# Patient Record
Sex: FEMALE | Race: BLACK OR AFRICAN AMERICAN | Employment: FULL TIME | URBAN - METROPOLITAN AREA
[De-identification: names, ages, dates, MRNs, and addresses within clinical notes are randomized per-mention and may not be internally consistent; named-entity substitution may affect disease eponyms.]

---

## 2017-03-30 ENCOUNTER — HOSPITAL ENCOUNTER (OUTPATIENT)
Dept: CT IMAGING | Age: 62
Discharge: HOME | End: 2017-03-30
Attending: FAMILY MEDICINE
Payer: SELF-PAY

## 2017-03-30 DIAGNOSIS — Z13.9 SCREENING: ICD-10-CM

## 2017-03-30 PROCEDURE — 75571 CT HRT W/O DYE W/CA TEST: CPT

## 2017-03-31 NOTE — CARDIO/PULMONARY
Cardiopulmonary Rehab: I reached this patient by phone and shared her coronary calcium CT score of \"6 \" with her. Education given regarding coronary artery disease and its effects on the cardiovascular system were reviewed. Patient states she is not a smoker. Patient states that she does not have a family history of coronary artery disease, reports she is currently taking cholesterol medication and blood pressure medication. Patient states she is diabetic, is overweight (BMI 32.9), reports not regularly making heart healthy diet choices and is not regularly physically active. We discussed the recommendations for and potential benefits of weight loss, regular physical activity and a heart healthy diet. Patient does not feel that stress is a risk factor for her. Patient to follow up with primary care physician. Understanding verbalized and no further questions at this time.

## 2017-05-15 ENCOUNTER — HOSPITAL ENCOUNTER (OUTPATIENT)
Dept: NUTRITION | Age: 62
Discharge: HOME | End: 2017-05-15
Payer: COMMERCIAL

## 2017-05-15 PROCEDURE — 97802 MEDICAL NUTRITION INDIV IN: CPT | Performed by: DIETITIAN, REGISTERED

## 2017-05-15 NOTE — PROGRESS NOTES
[phone] [place] [address], [facility] [place] [facility], [address] [facility] [place]  Jackie Banks   [phone]   Nutrition Assessment - Medical Nutrition Therapy   Outpatient  Initial Evaluation         Patient Name: Mal Vargas : 1955   Treatment Diagnosis: Diabetes Onset Date:    Referral Source: Loki Agarwal MD Start of Care [facility]): 5/15/2017     Ht: 67 in  cm Wt: 212.4 lb  kg   BMI: 33.3  BMR male    BMR female 1570     Diagnosis: Diabetes, Obesity, HTN     Medications of Nutritional Significance:   Janument xp, hydrochlorothiazide, losartan, farxiga, rosa asprin, lantus solostar, vitamin D. Subjective/Assessment: Pt is a 65yo female who works for [employer]. She has had diabetes for many years and notes that her last A1C was above 8%. She states she was previously better controlled when living alone and only cooking for herself. She believes her weight and A1C began to creep up after moving in with her significant other and adjusting to cooking for him (picky eater) and his son. She would like to decrease the amount of medications she is taking for her diabetes and has a personal weight goal of 185#. She does not currently have an exercise plan but is interested in starting the Fitness program offered at this [facility] location to jump-start her routine. Pt is knowledgeable of carbohydrate and protein sources, able to read labels, notices menu calorie labeling, and is able to carb count. She has previously had success with carb counting for weight loss before through another program, but did not continue the diet changes once she stopped attending the sessions. Reviewed pathophysiology of diabetes, importance of carb control, meal timing, and exercise for blood sugar and weight management. Asked pt to check for night time blood sugar as well as morning (150-215 average fasted).  She is currently using sliding scale insulin at night based on her morning blood sugars. Worked with pt to set goals and create meal plan. Pt expressed comprehension, high motivation, and compliance is expected. Current Eating Patterns: 3 meals + 2 snacks per day. Afternoon snack is sometimes chips or sweets. Buying lunches out instead of packing she notes her portions increase. Dinner meals she cooks for picky eating significant other and notes mostly meat and potatoes. Eating out more often lately and stress eating due to stress at work. This work stress is now over and she feels the stress eating will decrease as well. Portions sometimes larger when not measuring. Handouts/  Information Provided: [x]  Carbohydrates  [x]  Protein  []  Fiber  []  Serving Sizes  []  Meal and Snack Ideas  []  Food Journals []  Diabetes  []  Cholesterol  []  Sodium  [x]  Gen Nutr Guidelines  []  SBGM Guidelines  []  Others:    Estimate Needs:   Calories:  7974 Protein: 98 Carbs: 177 Fat: 52   Kcal/day  g/day  g/day  g/day         percent: 25 percent: 45 percent: 30     Nutritional Goal - To promote lifestyle changes to result in:    [x]  weight loss  [x]  Improved diabetic control  []  Decreased cholesterol levels  []  Decreased blood pressure  []  weight maintenance []  Preventing any interactions associated with food allergies  []  Adequate weight gain toward goal weight  []  Other:          Recommendations: -add vegetables to dinner meal for you (even if no one else is eating them)  -Eat 3 meals + 1-2 optional snacks each day. (within carbohydrate limits)  -20-30g cho per meal + 20-30g protein  -15-20g cho per snack + protein  -[place] at work.  Call to set up fitness program at [facility] with: pt   Potential Barriers to Learning: none     Dietitian Signature: Joaquin Bunch MS,RD Date: 5/15/2017   Follow-up: June 12 @ 5:30pm Time: 6:28 PM

## 2018-01-15 ENCOUNTER — HOSPITAL ENCOUNTER (OUTPATIENT)
Dept: MAMMOGRAPHY | Age: 63
Discharge: HOME | End: 2018-01-15
Attending: FAMILY MEDICINE
Payer: COMMERCIAL

## 2018-01-15 DIAGNOSIS — Z12.31 VISIT FOR SCREENING MAMMOGRAM: ICD-10-CM

## 2018-01-15 PROCEDURE — 77063 BREAST TOMOSYNTHESIS BI: CPT

## 2019-01-22 ENCOUNTER — HOSPITAL ENCOUNTER (OUTPATIENT)
Dept: MAMMOGRAPHY | Age: 64
Discharge: HOME | End: 2019-01-22
Attending: FAMILY MEDICINE
Payer: COMMERCIAL

## 2019-01-22 DIAGNOSIS — Z12.39 SCREENING BREAST EXAMINATION: ICD-10-CM

## 2019-01-22 PROCEDURE — 77063 BREAST TOMOSYNTHESIS BI: CPT

## 2019-10-10 ENCOUNTER — OFFICE VISIT (OUTPATIENT)
Dept: HEMATOLOGY | Age: 64
End: 2019-10-10

## 2019-10-10 VITALS
OXYGEN SATURATION: 100 % | HEART RATE: 63 BPM | TEMPERATURE: 98.4 F | DIASTOLIC BLOOD PRESSURE: 68 MMHG | WEIGHT: 208 LBS | SYSTOLIC BLOOD PRESSURE: 131 MMHG

## 2019-10-10 DIAGNOSIS — K76.0 FATTY LIVER: Primary | ICD-10-CM

## 2019-10-10 PROBLEM — E11.8 CONTROLLED TYPE 2 DIABETES MELLITUS WITH COMPLICATION, WITHOUT LONG-TERM CURRENT USE OF INSULIN (HCC): Status: ACTIVE | Noted: 2019-10-10

## 2019-10-10 PROBLEM — I10 ESSENTIAL HYPERTENSION: Status: ACTIVE | Noted: 2019-10-10

## 2019-10-10 PROBLEM — E78.00 HIGH CHOLESTEROL: Status: ACTIVE | Noted: 2019-10-10

## 2019-10-10 RX ORDER — ROSUVASTATIN CALCIUM 40 MG/1
40 TABLET, COATED ORAL
COMMUNITY

## 2019-10-10 RX ORDER — ASPIRIN 81 MG/1
TABLET ORAL DAILY
COMMUNITY
End: 2021-10-25

## 2019-10-10 RX ORDER — INSULIN DEGLUDEC 100 U/ML
INJECTION, SOLUTION SUBCUTANEOUS
COMMUNITY
End: 2021-10-25

## 2019-10-10 RX ORDER — BUMETANIDE 0.5 MG/1
0.5 TABLET ORAL
COMMUNITY

## 2019-10-10 RX ORDER — VALSARTAN 160 MG/1
TABLET ORAL DAILY
COMMUNITY
End: 2021-10-25

## 2019-10-10 RX ORDER — METFORMIN HYDROCHLORIDE 500 MG/1
500 TABLET ORAL 2 TIMES DAILY WITH MEALS
COMMUNITY

## 2019-10-10 NOTE — PROGRESS NOTES
Chief Complaint   Patient presents with    Other     fibroscan     3 most recent PHQ Screens 10/10/2019   Little interest or pleasure in doing things Not at all   Feeling down, depressed, irritable, or hopeless Not at all   Total Score PHQ 2 0     Abuse Screening Questionnaire 10/10/2019   Do you ever feel afraid of your partner? N   Are you in a relationship with someone who physically or mentally threatens you? N   Is it safe for you to go home?  Y     Learning Assessment 10/10/2019   PRIMARY LEARNER Patient   BARRIERS PRIMARY LEARNER NONE   CO-LEARNER CAREGIVER No   PRIMARY LANGUAGE ENGLISH   LEARNER PREFERENCE PRIMARY DEMONSTRATION   ANSWERED BY patient   RELATIONSHIP SELF     Visit Vitals  /68 (BP 1 Location: Right arm, BP Patient Position: Sitting)   Pulse 63   Temp 98.4 °F (36.9 °C) (Tympanic)   Wt 208 lb (94.3 kg)   SpO2 100%

## 2019-10-10 NOTE — PROGRESS NOTES
[address] [facility], MD, MD Manuel Bonds PA-C Morrell Gallop, [facility]-BC     Estela NICHOLS Nancy, [facility]   JOSEFINA Harp, [facility]       Mayito Deputado Jaret De Yanez [address]    at [address], [address] [place] [address], [place] [address].    [phone]    FAX: [address] [place] [address]    at [address], [address], [address] [place] [address],[address]    [address] [place] [address] Street: [phone]     Patient Care Team:  Jennifer Lee MD as PCP - General (Family Practice)  Lotus Mortimer, MD (Gastroenterology)     Problem List  Never Reviewed          Codes Class Noted    Controlled type 2 diabetes mellitus with complication, without long-term current use of insulin ([facility] ICD-10-CM: E11.8  ICD-9-CM: 250.90  10/10/2019        Fatty liver ICD-10-CM: K76.0  ICD-9-CM: 571.8  10/10/2019        Essential hypertension ICD-10-CM: I10  ICD-9-CM: 401.9  10/10/2019        High cholesterol ICD-10-CM: E78.00  ICD-9-CM: 272.0  10/10/2019            The physicians listed above have asked me to see Maria Teresa Mansfield in consultation regarding fatty liver and to perform assessment of fibrosis by means of in-office FibroScan analysis. The patient is a 60 y.o.  female who was found to have liver disease in 2019. The patient has the following symptoms which are thought to be due to the liver disease: pain in the right side over the liver. Although she states an ultrasound showed she had gallstones. The patient has not experienced the following symptoms: yellowing of the eyes or skin or swelling of the lower extremities. The patient completes all daily activities without any functional limitations. 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       Weight loss is the only treatment for NAFLD and this was discussed in detail with the patient in the office today. We discussed the fatty liver diet and a handout was provided. Assessment of fibrosis was made through performance of FibroScan in the office today. The results of the analysis were shared with the patient in the office at the time of the procedure. A copy of the report was provided to the patient and will be forwarded to the referring medical practice. All questions were answered. The patient expressed a clear understanding of the above. ALLERGIES:  Not on File    MEDICATIONS:  Current Outpatient Medications   Medication Sig    metFORMIN (GLUCOPHAGE) 500 mg tablet Take  by mouth two (2) times daily (with meals).  semaglutide (OZEMPIC) 0.25 mg/0.2 mL (2 mg/1.5 mL) sub-q pen by SubCUTAneous route every seven (7) days.  insulin degludec (TRESIBA FLEXTOUCH U-100) 100 unit/mL (3 mL) inpn by SubCUTAneous route.  bumetanide (BUMEX) 0.5 mg tablet Take  by mouth daily.  aspirin delayed-release 81 mg tablet Take  by mouth daily.  rosuvastatin (CRESTOR) 40 mg tablet Take 40 mg by mouth nightly.  valsartan (DIOVAN) 160 mg tablet Take  by mouth daily.  ergocalciferol, vitamin D2, (VITAMIN D2 PO) Take  by mouth.  vitamin E acetate (VITAMIN E PO) Take  by mouth. No current facility-administered medications for this visit. SYSTEM REVIEW NOT RELATED TO LIVER DISEASE OR REVIEWED ABOVE:  Constitution systems: Negative for fever, chills, weight gain, weight loss. Eyes: Negative for visual changes. ENT: Negative for sore throat, painful swallowing. Respiratory: Negative for cough, hemoptysis, SOB. Cardiology: Negative for chest pain, palpitations. GI:  Negative for constipation or diarrhea. : Negative for urinary frequency, dysuria, hematuria, nocturia. Skin: Negative for rash. Hematology: Negative for easy bruising, blood clots.     Musculo-skeletal: Negative for back pain, muscle pain, weakness. Neurologic: Negative for headaches, dizziness, vertigo, memory problems not related to HE. Psychology: Negative for anxiety, depression. PHYSICAL EXAMINATION:  Visit Vitals  /68 (BP 1 Location: Right arm, BP Patient Position: Sitting)   Pulse 63   Temp 98.4 °F (36.9 °C) (Tympanic)   Wt 208 lb (94.3 kg)   SpO2 100%     General: No acute distress. Skin: No spider angiomata. No jaundice. No palmar erythema. Abdomen: Soft non-tender. Liver size normal to percussion/palpation. Spleen not palpable. No obvious ascites. Extremities: No edema. No muscle wasting. No gross arthritic changes. Neurologic: Alert and oriented. Cranial nerves grossly intact. No asterixis. LABORATORY STUDIES:  Recent liver function panel, CBC with platelet count and BMP are not available. SEROLOGIES:  Not available or performed. Testing will be performed as indicated. LIVER HISTOLOGY:  10/2019. FibroScan performed at [facility] [employer] of [address] [place] [address]. EkPa was 3.9. IQR/med 10%. . The results suggested a fibrosis level of F0. The CAP score suggests fatty liver. ENDOSCOPIC PROCEDURES:  Not available or performed    RADIOLOGY:  Not available or performed    OTHER TESTING:  Not available or performed    FOLLOW-UP:  All of the issues listed above in the assessment and plan were discussed with the patient. All questions were answered. The patient expressed a clear understanding of the above. The patient will follow-up with the referring physician.     Elysia Meek, ALDEN-BC  Liver [facility] [address] [place] [address], [address] [address]  [address]. [phone]

## 2020-01-27 ENCOUNTER — HOSPITAL ENCOUNTER (OUTPATIENT)
Dept: MAMMOGRAPHY | Age: 65
Discharge: HOME | End: 2020-01-27
Attending: OBSTETRICS & GYNECOLOGY
Payer: COMMERCIAL

## 2020-01-27 DIAGNOSIS — Z12.31 VISIT FOR SCREENING MAMMOGRAM: ICD-10-CM

## 2020-01-27 PROCEDURE — 77063 BREAST TOMOSYNTHESIS BI: CPT

## 2020-07-24 ENCOUNTER — HOSPITAL ENCOUNTER (OUTPATIENT)
Dept: CT IMAGING | Age: 65
Discharge: HOME | End: 2020-07-24
Attending: INTERNAL MEDICINE
Payer: COMMERCIAL

## 2020-07-24 DIAGNOSIS — R10.11 RIGHT UPPER QUADRANT PAIN: ICD-10-CM

## 2020-07-24 DIAGNOSIS — K21.9 GASTROESOPHAGEAL REFLUX DISEASE WITHOUT ESOPHAGITIS: ICD-10-CM

## 2020-07-24 DIAGNOSIS — K44.9 DIAPHRAGMATIC HERNIA WITHOUT OBSTRUCTION OR GANGRENE: ICD-10-CM

## 2020-07-24 DIAGNOSIS — K76.0 NONALCOHOLIC FATTY LIVER DISEASE WITHOUT NONALCOHOLIC STEATOHEPATITIS (NASH): ICD-10-CM

## 2020-07-24 PROCEDURE — 74177 CT ABD & PELVIS W/CONTRAST: CPT

## 2020-07-24 PROCEDURE — 74011000255 HC RX REV CODE- 255: Performed by: INTERNAL MEDICINE

## 2020-07-24 PROCEDURE — 74011636320 HC RX REV CODE- 636/320: Performed by: INTERNAL MEDICINE

## 2020-07-24 PROCEDURE — 74011000258 HC RX REV CODE- 258: Performed by: INTERNAL MEDICINE

## 2020-07-24 RX ORDER — BARIUM SULFATE 20 MG/ML
900 SUSPENSION ORAL
Status: COMPLETED | OUTPATIENT
Start: 2020-07-24 | End: 2020-07-24

## 2020-07-24 RX ORDER — SODIUM CHLORIDE 9 MG/ML
50 INJECTION, SOLUTION INTRAVENOUS ONCE
Status: COMPLETED | OUTPATIENT
Start: 2020-07-24 | End: 2020-07-24

## 2020-07-24 RX ORDER — SODIUM CHLORIDE 0.9 % (FLUSH) 0.9 %
10 SYRINGE (ML) INJECTION ONCE
Status: COMPLETED | OUTPATIENT
Start: 2020-07-24 | End: 2020-07-24

## 2020-07-24 RX ADMIN — Medication 10 ML: at 09:30

## 2020-07-24 RX ADMIN — SODIUM CHLORIDE 50 ML: 900 INJECTION, SOLUTION INTRAVENOUS at 09:30

## 2020-07-24 RX ADMIN — BARIUM SULFATE 900 ML: 20 SUSPENSION ORAL at 09:30

## 2020-07-24 RX ADMIN — IOPAMIDOL 100 ML: 755 INJECTION, SOLUTION INTRAVENOUS at 09:30

## 2021-01-15 ENCOUNTER — TRANSCRIBE ORDER (OUTPATIENT)
Dept: SCHEDULING | Age: 66
End: 2021-01-15

## 2021-01-15 DIAGNOSIS — Z12.31 VISIT FOR SCREENING MAMMOGRAM: Primary | ICD-10-CM

## 2021-02-10 ENCOUNTER — HOSPITAL ENCOUNTER (OUTPATIENT)
Dept: MAMMOGRAPHY | Age: 66
Discharge: HOME | End: 2021-02-10
Attending: OBSTETRICS & GYNECOLOGY
Payer: COMMERCIAL

## 2021-02-10 DIAGNOSIS — Z12.31 VISIT FOR SCREENING MAMMOGRAM: ICD-10-CM

## 2021-02-10 PROCEDURE — 77063 BREAST TOMOSYNTHESIS BI: CPT

## 2021-02-18 ENCOUNTER — TRANSCRIBE ORDER (OUTPATIENT)
Dept: SCHEDULING | Age: 66
End: 2021-02-18

## 2021-02-18 DIAGNOSIS — E83.52 HYPERCALCEMIA: Primary | ICD-10-CM

## 2021-03-06 ENCOUNTER — IMMUNIZATION (OUTPATIENT)
Dept: INTERNAL MEDICINE CLINIC | Age: 66
End: 2021-03-06
Payer: COMMERCIAL

## 2021-03-06 DIAGNOSIS — Z23 ENCOUNTER FOR IMMUNIZATION: Primary | ICD-10-CM

## 2021-03-06 PROCEDURE — 0001A COVID-19, MRNA, LNP-S, PF, 30MCG/0.3ML DOSE(PFIZER): CPT | Performed by: FAMILY MEDICINE

## 2021-03-06 PROCEDURE — 91300 COVID-19, MRNA, LNP-S, PF, 30MCG/0.3ML DOSE(PFIZER): CPT | Performed by: FAMILY MEDICINE

## 2021-03-27 ENCOUNTER — IMMUNIZATION (OUTPATIENT)
Dept: INTERNAL MEDICINE CLINIC | Age: 66
End: 2021-03-27
Payer: COMMERCIAL

## 2021-03-27 DIAGNOSIS — Z23 ENCOUNTER FOR IMMUNIZATION: Primary | ICD-10-CM

## 2021-03-27 PROCEDURE — 91300 COVID-19, MRNA, LNP-S, PF, 30MCG/0.3ML DOSE(PFIZER): CPT | Performed by: FAMILY MEDICINE

## 2021-03-27 PROCEDURE — 0002A COVID-19, MRNA, LNP-S, PF, 30MCG/0.3ML DOSE(PFIZER): CPT | Performed by: FAMILY MEDICINE

## 2021-04-01 ENCOUNTER — HOSPITAL ENCOUNTER (OUTPATIENT)
Dept: NUCLEAR MEDICINE | Age: 66
Discharge: HOME | End: 2021-04-01
Attending: INTERNAL MEDICINE
Payer: COMMERCIAL

## 2021-04-01 ENCOUNTER — HOSPITAL ENCOUNTER (OUTPATIENT)
Dept: ULTRASOUND IMAGING | Age: 66
Discharge: HOME | End: 2021-04-01
Attending: INTERNAL MEDICINE
Payer: COMMERCIAL

## 2021-04-01 DIAGNOSIS — E83.52 HYPERCALCEMIA: ICD-10-CM

## 2021-04-01 PROCEDURE — 76536 US EXAM OF HEAD AND NECK: CPT

## 2021-04-01 PROCEDURE — 78070 PARATHYROID PLANAR IMAGING: CPT

## 2021-05-20 ENCOUNTER — TRANSCRIBE ORDER (OUTPATIENT)
Dept: SCHEDULING | Age: 66
End: 2021-05-20

## 2021-05-20 DIAGNOSIS — E04.2 MULTINODULAR NON-TOXIC GOITER: Primary | ICD-10-CM

## 2021-05-27 ENCOUNTER — HOSPITAL ENCOUNTER (OUTPATIENT)
Dept: ULTRASOUND IMAGING | Age: 66
Discharge: HOME | End: 2021-05-27
Attending: INTERNAL MEDICINE
Payer: COMMERCIAL

## 2021-05-27 DIAGNOSIS — E04.2 MULTINODULAR NON-TOXIC GOITER: ICD-10-CM

## 2021-05-27 PROCEDURE — 77030014115

## 2021-05-27 PROCEDURE — 88172 CYTP DX EVAL FNA 1ST EA SITE: CPT

## 2021-05-27 PROCEDURE — 10005 FNA BX W/US GDN 1ST LES: CPT

## 2021-05-27 PROCEDURE — 88173 CYTOPATH EVAL FNA REPORT: CPT

## 2021-05-27 PROCEDURE — 74011000250 HC RX REV CODE- 250: Performed by: NURSE PRACTITIONER

## 2021-05-27 RX ORDER — LIDOCAINE HYDROCHLORIDE 10 MG/ML
10 INJECTION, SOLUTION EPIDURAL; INFILTRATION; INTRACAUDAL; PERINEURAL
Status: COMPLETED | OUTPATIENT
Start: 2021-05-27 | End: 2021-05-27

## 2021-05-27 RX ADMIN — LIDOCAINE HYDROCHLORIDE 10 ML: 10 INJECTION, SOLUTION EPIDURAL; INFILTRATION; INTRACAUDAL; PERINEURAL at 14:00

## 2021-10-14 ENCOUNTER — TRANSCRIBE ORDER (OUTPATIENT)
Dept: REGISTRATION | Age: 66
End: 2021-10-14

## 2021-10-14 DIAGNOSIS — Z01.812 PRE-PROCEDURE LAB EXAM: Primary | ICD-10-CM

## 2021-10-25 ENCOUNTER — HOSPITAL ENCOUNTER (OUTPATIENT)
Dept: PREADMISSION TESTING | Age: 66
Discharge: HOME | End: 2021-10-25
Payer: COMMERCIAL

## 2021-10-25 VITALS
BODY MASS INDEX: 31.25 KG/M2 | WEIGHT: 199.08 LBS | HEIGHT: 67 IN | TEMPERATURE: 98.5 F | SYSTOLIC BLOOD PRESSURE: 135 MMHG | DIASTOLIC BLOOD PRESSURE: 76 MMHG | HEART RATE: 61 BPM

## 2021-10-25 DIAGNOSIS — Z01.812 PRE-PROCEDURE LAB EXAM: ICD-10-CM

## 2021-10-25 DIAGNOSIS — E11.65 TYPE 2 DIABETES MELLITUS WITH HYPERGLYCEMIA, UNSPECIFIED WHETHER LONG TERM INSULIN USE (HCC): ICD-10-CM

## 2021-10-25 DIAGNOSIS — E11.8 CONTROLLED TYPE 2 DIABETES MELLITUS WITH COMPLICATION, WITHOUT LONG-TERM CURRENT USE OF INSULIN (HCC): Primary | ICD-10-CM

## 2021-10-25 LAB
ALBUMIN SERPL-MCNC: 3.5 G/DL (ref 3.5–5)
ALBUMIN/GLOB SERPL: 1.1 {RATIO} (ref 1.1–2.2)
ALP SERPL-CCNC: 124 U/L (ref 45–117)
ALT SERPL-CCNC: 20 U/L (ref 12–78)
ANION GAP SERPL CALC-SCNC: 3 MMOL/L (ref 5–15)
AST SERPL-CCNC: 8 U/L (ref 15–37)
BASOPHILS # BLD: 0 K/UL (ref 0–0.1)
BASOPHILS NFR BLD: 1 % (ref 0–1)
BILIRUB SERPL-MCNC: 0.5 MG/DL (ref 0.2–1)
BUN SERPL-MCNC: 8 MG/DL (ref 6–20)
BUN/CREAT SERPL: 11 (ref 12–20)
CALCIUM SERPL-MCNC: 10 MG/DL (ref 8.5–10.1)
CHLORIDE SERPL-SCNC: 107 MMOL/L (ref 97–108)
CO2 SERPL-SCNC: 31 MMOL/L (ref 21–32)
CREAT SERPL-MCNC: 0.76 MG/DL (ref 0.55–1.02)
DIFFERENTIAL METHOD BLD: NORMAL
EOSINOPHIL # BLD: 0.1 K/UL (ref 0–0.4)
EOSINOPHIL NFR BLD: 2 % (ref 0–7)
ERYTHROCYTE [DISTWIDTH] IN BLOOD BY AUTOMATED COUNT: 13.2 % (ref 11.5–14.5)
EST. AVERAGE GLUCOSE BLD GHB EST-MCNC: 194 MG/DL
GLOBULIN SER CALC-MCNC: 3.1 G/DL (ref 2–4)
GLUCOSE SERPL-MCNC: 140 MG/DL (ref 65–100)
HBA1C MFR BLD: 8.4 % (ref 4–5.6)
HCT VFR BLD AUTO: 44.9 % (ref 35–47)
HGB BLD-MCNC: 14.1 G/DL (ref 11.5–16)
IMM GRANULOCYTES # BLD AUTO: 0 K/UL (ref 0–0.04)
IMM GRANULOCYTES NFR BLD AUTO: 0 % (ref 0–0.5)
LYMPHOCYTES # BLD: 1.4 K/UL (ref 0.8–3.5)
LYMPHOCYTES NFR BLD: 35 % (ref 12–49)
MCH RBC QN AUTO: 28.7 PG (ref 26–34)
MCHC RBC AUTO-ENTMCNC: 31.4 G/DL (ref 30–36.5)
MCV RBC AUTO: 91.3 FL (ref 80–99)
MONOCYTES # BLD: 0.4 K/UL (ref 0–1)
MONOCYTES NFR BLD: 9 % (ref 5–13)
NEUTS SEG # BLD: 2.2 K/UL (ref 1.8–8)
NEUTS SEG NFR BLD: 53 % (ref 32–75)
NRBC # BLD: 0 K/UL (ref 0–0.01)
NRBC BLD-RTO: 0 PER 100 WBC
PLATELET # BLD AUTO: 235 K/UL (ref 150–400)
PMV BLD AUTO: 10.4 FL (ref 8.9–12.9)
POTASSIUM SERPL-SCNC: 4.1 MMOL/L (ref 3.5–5.1)
PROT SERPL-MCNC: 6.6 G/DL (ref 6.4–8.2)
RBC # BLD AUTO: 4.92 M/UL (ref 3.8–5.2)
SODIUM SERPL-SCNC: 141 MMOL/L (ref 136–145)
WBC # BLD AUTO: 4.1 K/UL (ref 3.6–11)

## 2021-10-25 PROCEDURE — 85025 COMPLETE CBC W/AUTO DIFF WBC: CPT

## 2021-10-25 PROCEDURE — 80053 COMPREHEN METABOLIC PANEL: CPT

## 2021-10-25 PROCEDURE — U0003 INFECTIOUS AGENT DETECTION BY NUCLEIC ACID (DNA OR RNA); SEVERE ACUTE RESPIRATORY SYNDROME CORONAVIRUS 2 (SARS-COV-2) (CORONAVIRUS DISEASE [COVID-19]), AMPLIFIED PROBE TECHNIQUE, MAKING USE OF HIGH THROUGHPUT TECHNOLOGIES AS DESCRIBED BY CMS-2020-01-R: HCPCS

## 2021-10-25 PROCEDURE — 36415 COLL VENOUS BLD VENIPUNCTURE: CPT

## 2021-10-25 PROCEDURE — 83036 HEMOGLOBIN GLYCOSYLATED A1C: CPT

## 2021-10-25 NOTE — PERIOP NOTES
PT GIVEN NPO DIET INSTRUCTIONS. Pt given 2 bottles of CHG soap and instructed in use. Patient given surgical site infection FAQs handout and hand hygiene tips sheet. Pre-operative instructions reviewed and patient verbalizes understanding of instructions. Patient has been given the opportunity to ask additional questions. PT GIVEN COVID-19 TESTING INSTRUCTIONS. SURGICAL CONSENT OBTAINED IN PAT. 10/25/21 @ 7199 - SPOKE WITH ROBERT, REQUESTED MOST RECENT OFFICE NOTES, TEST(S) & EKG TO BE FAXED TO PAT.

## 2021-10-26 LAB
SARS-COV-2, XPLCVT: NOT DETECTED
SOURCE, COVRS: NORMAL

## 2021-10-26 NOTE — PERIOP NOTES
Hemoglobin A1c-8.4 in PAT. Dr. Shaw Speaks Dr. Linares November offices notified of results via fax. Order entered for outpatient referral to [facility].      Virginia Schaeffer NP

## 2021-10-26 NOTE — PERIOP NOTES
PAT TEST RESULTS AND CARDIAC NOTES/EKG FAXED TO DR. LEONORA CAMPOVERDE'S OFFICE;  Sarah Grady NP REVIEWED PT'S RESULTS AND CARDIAC NOTES/EKG; SHE INITIATED REFERRAL TO DIAB[facility].  DR. Eve Hurd OFFICE WAS NOTIFIED BY HER.

## 2021-10-27 ENCOUNTER — ANESTHESIA EVENT (OUTPATIENT)
Dept: SURGERY | Age: 66
End: 2021-10-27
Payer: COMMERCIAL

## 2021-10-28 ENCOUNTER — ANESTHESIA (OUTPATIENT)
Dept: SURGERY | Age: 66
End: 2021-10-28
Payer: COMMERCIAL

## 2021-10-28 ENCOUNTER — HOSPITAL ENCOUNTER (OUTPATIENT)
Age: 66
Discharge: HOME | End: 2021-10-29
Attending: OTOLARYNGOLOGY | Admitting: OTOLARYNGOLOGY
Payer: COMMERCIAL

## 2021-10-28 DIAGNOSIS — E04.1 RIGHT THYROID NODULE: Primary | ICD-10-CM

## 2021-10-28 PROBLEM — E21.0 PRIMARY HYPERPARATHYROIDISM (HCC): Status: ACTIVE | Noted: 2021-10-28

## 2021-10-28 LAB
CALCIUM SERPL-MCNC: 9.3 MG/DL (ref 8.5–10.1)
GLUCOSE BLD STRIP.AUTO-MCNC: 107 MG/DL (ref 65–117)
GLUCOSE BLD STRIP.AUTO-MCNC: 117 MG/DL (ref 65–117)
GLUCOSE BLD STRIP.AUTO-MCNC: 288 MG/DL (ref 65–117)
INTRA-OP PTH, IPTHRT: 18.1 PG/ML (ref 18.4–88)
INTRA-OP PTH, IPTHRT: 35.7 PG/ML (ref 18.4–88)
INTRA-OP PTH, IPTHRT: 67.8 PG/ML (ref 18.4–88)
PTH-INTACT IO % DIF SERPL: ABNORMAL %
SERVICE CMNT-IMP: ABNORMAL
SERVICE CMNT-IMP: NORMAL
SERVICE CMNT-IMP: NORMAL
SPECIMEN DRAWN SERPL: 1401
SPECIMEN DRAWN SERPL: 1500
SPECIMEN DRAWN SERPL: 1512

## 2021-10-28 PROCEDURE — 77030031139 HC SUT VCRL2 J&J -A: Performed by: OTOLARYNGOLOGY

## 2021-10-28 PROCEDURE — 77030002916 HC SUT ETHLN J&J -A: Performed by: OTOLARYNGOLOGY

## 2021-10-28 PROCEDURE — 77030026438 HC STYL ET INTUB CARD -A: Performed by: STUDENT IN AN ORGANIZED HEALTH CARE EDUCATION/TRAINING PROGRAM

## 2021-10-28 PROCEDURE — 77030040356 HC CORD BPLR FRCP COVD -A: Performed by: OTOLARYNGOLOGY

## 2021-10-28 PROCEDURE — 76060000034 HC ANESTHESIA 1.5 TO 2 HR: Performed by: OTOLARYNGOLOGY

## 2021-10-28 PROCEDURE — 74011000250 HC RX REV CODE- 250: Performed by: NURSE ANESTHETIST, CERTIFIED REGISTERED

## 2021-10-28 PROCEDURE — 76010000153 HC OR TIME 1.5 TO 2 HR: Performed by: OTOLARYNGOLOGY

## 2021-10-28 PROCEDURE — 77030008684 HC TU ET CUF COVD -B: Performed by: STUDENT IN AN ORGANIZED HEALTH CARE EDUCATION/TRAINING PROGRAM

## 2021-10-28 PROCEDURE — 77030040922 HC BLNKT HYPOTHRM STRY -A

## 2021-10-28 PROCEDURE — 74011250636 HC RX REV CODE- 250/636: Performed by: STUDENT IN AN ORGANIZED HEALTH CARE EDUCATION/TRAINING PROGRAM

## 2021-10-28 PROCEDURE — 83970 ASSAY OF PARATHORMONE: CPT

## 2021-10-28 PROCEDURE — 99218 HC RM OBSERVATION: CPT

## 2021-10-28 PROCEDURE — 74011000250 HC RX REV CODE- 250: Performed by: OTOLARYNGOLOGY

## 2021-10-28 PROCEDURE — 36415 COLL VENOUS BLD VENIPUNCTURE: CPT

## 2021-10-28 PROCEDURE — 2709999900 HC NON-CHARGEABLE SUPPLY: Performed by: OTOLARYNGOLOGY

## 2021-10-28 PROCEDURE — 74011250636 HC RX REV CODE- 250/636: Performed by: OTOLARYNGOLOGY

## 2021-10-28 PROCEDURE — 74011250636 HC RX REV CODE- 250/636: Performed by: NURSE ANESTHETIST, CERTIFIED REGISTERED

## 2021-10-28 PROCEDURE — 74011636637 HC RX REV CODE- 636/637: Performed by: OTOLARYNGOLOGY

## 2021-10-28 PROCEDURE — 88331 PATH CONSLTJ SURG 1 BLK 1SPC: CPT

## 2021-10-28 PROCEDURE — 82310 ASSAY OF CALCIUM: CPT

## 2021-10-28 PROCEDURE — 74011250637 HC RX REV CODE- 250/637: Performed by: STUDENT IN AN ORGANIZED HEALTH CARE EDUCATION/TRAINING PROGRAM

## 2021-10-28 PROCEDURE — 77030002933 HC SUT MCRYL J&J -A: Performed by: OTOLARYNGOLOGY

## 2021-10-28 PROCEDURE — 77030029099 HC BN WAX SSPC -A: Performed by: OTOLARYNGOLOGY

## 2021-10-28 PROCEDURE — 77030011267 HC ELECTRD BLD COVD -A: Performed by: OTOLARYNGOLOGY

## 2021-10-28 PROCEDURE — 88305 TISSUE EXAM BY PATHOLOGIST: CPT

## 2021-10-28 PROCEDURE — 77030002996 HC SUT SLK J&J -A: Performed by: OTOLARYNGOLOGY

## 2021-10-28 PROCEDURE — 82962 GLUCOSE BLOOD TEST: CPT

## 2021-10-28 PROCEDURE — 77030040506 HC DRN WND MDII -A: Performed by: OTOLARYNGOLOGY

## 2021-10-28 PROCEDURE — 74011250637 HC RX REV CODE- 250/637: Performed by: OTOLARYNGOLOGY

## 2021-10-28 PROCEDURE — 76210000016 HC OR PH I REC 1 TO 1.5 HR: Performed by: OTOLARYNGOLOGY

## 2021-10-28 PROCEDURE — 77030040361 HC SLV COMPR DVT MDII -B: Performed by: OTOLARYNGOLOGY

## 2021-10-28 PROCEDURE — 88307 TISSUE EXAM BY PATHOLOGIST: CPT

## 2021-10-28 RX ORDER — ROSUVASTATIN CALCIUM 40 MG/1
40 TABLET, COATED ORAL
Status: DISCONTINUED | OUTPATIENT
Start: 2021-10-28 | End: 2021-10-29 | Stop reason: HOSPADM

## 2021-10-28 RX ORDER — GLYCOPYRROLATE 0.2 MG/ML
INJECTION INTRAMUSCULAR; INTRAVENOUS AS NEEDED
Status: DISCONTINUED | OUTPATIENT
Start: 2021-10-28 | End: 2021-10-28 | Stop reason: HOSPADM

## 2021-10-28 RX ORDER — LIDOCAINE HYDROCHLORIDE 10 MG/ML
0.1 INJECTION, SOLUTION EPIDURAL; INFILTRATION; INTRACAUDAL; PERINEURAL AS NEEDED
Status: DISCONTINUED | OUTPATIENT
Start: 2021-10-28 | End: 2021-10-28 | Stop reason: HOSPADM

## 2021-10-28 RX ORDER — EPHEDRINE SULFATE/0.9% NACL/PF 50 MG/5 ML
SYRINGE (ML) INTRAVENOUS AS NEEDED
Status: DISCONTINUED | OUTPATIENT
Start: 2021-10-28 | End: 2021-10-28 | Stop reason: HOSPADM

## 2021-10-28 RX ORDER — LIDOCAINE HYDROCHLORIDE 20 MG/ML
INJECTION, SOLUTION EPIDURAL; INFILTRATION; INTRACAUDAL; PERINEURAL AS NEEDED
Status: DISCONTINUED | OUTPATIENT
Start: 2021-10-28 | End: 2021-10-28 | Stop reason: HOSPADM

## 2021-10-28 RX ORDER — OXYCODONE AND ACETAMINOPHEN 5; 325 MG/1; MG/1
2 TABLET ORAL
Status: DISCONTINUED | OUTPATIENT
Start: 2021-10-28 | End: 2021-10-29 | Stop reason: HOSPADM

## 2021-10-28 RX ORDER — DEXAMETHASONE SODIUM PHOSPHATE 4 MG/ML
INJECTION, SOLUTION INTRA-ARTICULAR; INTRALESIONAL; INTRAMUSCULAR; INTRAVENOUS; SOFT TISSUE AS NEEDED
Status: DISCONTINUED | OUTPATIENT
Start: 2021-10-28 | End: 2021-10-28 | Stop reason: HOSPADM

## 2021-10-28 RX ORDER — METFORMIN HYDROCHLORIDE 500 MG/1
500 TABLET ORAL 2 TIMES DAILY WITH MEALS
Status: DISCONTINUED | OUTPATIENT
Start: 2021-10-29 | End: 2021-10-29 | Stop reason: HOSPADM

## 2021-10-28 RX ORDER — SUCCINYLCHOLINE CHLORIDE 20 MG/ML
INJECTION INTRAMUSCULAR; INTRAVENOUS AS NEEDED
Status: DISCONTINUED | OUTPATIENT
Start: 2021-10-28 | End: 2021-10-28 | Stop reason: HOSPADM

## 2021-10-28 RX ORDER — SODIUM CHLORIDE 0.9 % (FLUSH) 0.9 %
5-40 SYRINGE (ML) INJECTION AS NEEDED
Status: DISCONTINUED | OUTPATIENT
Start: 2021-10-28 | End: 2021-10-28

## 2021-10-28 RX ORDER — SODIUM CHLORIDE 0.9 % (FLUSH) 0.9 %
5-40 SYRINGE (ML) INJECTION EVERY 8 HOURS
Status: DISCONTINUED | OUTPATIENT
Start: 2021-10-28 | End: 2021-10-28 | Stop reason: HOSPADM

## 2021-10-28 RX ORDER — DEXMEDETOMIDINE HYDROCHLORIDE 100 UG/ML
INJECTION, SOLUTION INTRAVENOUS AS NEEDED
Status: DISCONTINUED | OUTPATIENT
Start: 2021-10-28 | End: 2021-10-28 | Stop reason: HOSPADM

## 2021-10-28 RX ORDER — MIDAZOLAM HYDROCHLORIDE 1 MG/ML
1 INJECTION, SOLUTION INTRAMUSCULAR; INTRAVENOUS AS NEEDED
Status: DISCONTINUED | OUTPATIENT
Start: 2021-10-28 | End: 2021-10-28 | Stop reason: HOSPADM

## 2021-10-28 RX ORDER — MIDAZOLAM HYDROCHLORIDE 1 MG/ML
0.5 INJECTION, SOLUTION INTRAMUSCULAR; INTRAVENOUS
Status: DISCONTINUED | OUTPATIENT
Start: 2021-10-28 | End: 2021-10-28

## 2021-10-28 RX ORDER — INSULIN GLARGINE 100 [IU]/ML
8 INJECTION, SOLUTION SUBCUTANEOUS DAILY
Status: DISCONTINUED | OUTPATIENT
Start: 2021-10-29 | End: 2021-10-29 | Stop reason: HOSPADM

## 2021-10-28 RX ORDER — SODIUM CHLORIDE 0.9 % (FLUSH) 0.9 %
5-40 SYRINGE (ML) INJECTION AS NEEDED
Status: DISCONTINUED | OUTPATIENT
Start: 2021-10-28 | End: 2021-10-28 | Stop reason: HOSPADM

## 2021-10-28 RX ORDER — PROPOFOL 10 MG/ML
INJECTION, EMULSION INTRAVENOUS AS NEEDED
Status: DISCONTINUED | OUTPATIENT
Start: 2021-10-28 | End: 2021-10-28 | Stop reason: HOSPADM

## 2021-10-28 RX ORDER — SODIUM CHLORIDE, SODIUM LACTATE, POTASSIUM CHLORIDE, CALCIUM CHLORIDE 600; 310; 30; 20 MG/100ML; MG/100ML; MG/100ML; MG/100ML
125 INJECTION, SOLUTION INTRAVENOUS CONTINUOUS
Status: DISCONTINUED | OUTPATIENT
Start: 2021-10-28 | End: 2021-10-28 | Stop reason: HOSPADM

## 2021-10-28 RX ORDER — MIDAZOLAM HYDROCHLORIDE 1 MG/ML
INJECTION, SOLUTION INTRAMUSCULAR; INTRAVENOUS AS NEEDED
Status: DISCONTINUED | OUTPATIENT
Start: 2021-10-28 | End: 2021-10-28 | Stop reason: HOSPADM

## 2021-10-28 RX ORDER — PHENYLEPHRINE HCL IN 0.9% NACL 0.4MG/10ML
SYRINGE (ML) INTRAVENOUS AS NEEDED
Status: DISCONTINUED | OUTPATIENT
Start: 2021-10-28 | End: 2021-10-28 | Stop reason: HOSPADM

## 2021-10-28 RX ORDER — MORPHINE SULFATE 2 MG/ML
2 INJECTION, SOLUTION INTRAMUSCULAR; INTRAVENOUS
Status: DISCONTINUED | OUTPATIENT
Start: 2021-10-28 | End: 2021-10-29 | Stop reason: HOSPADM

## 2021-10-28 RX ORDER — NEOSTIGMINE METHYLSULFATE 1 MG/ML
INJECTION, SOLUTION INTRAVENOUS AS NEEDED
Status: DISCONTINUED | OUTPATIENT
Start: 2021-10-28 | End: 2021-10-28 | Stop reason: HOSPADM

## 2021-10-28 RX ORDER — LIDOCAINE HYDROCHLORIDE AND EPINEPHRINE 10; 10 MG/ML; UG/ML
INJECTION, SOLUTION INFILTRATION; PERINEURAL AS NEEDED
Status: DISCONTINUED | OUTPATIENT
Start: 2021-10-28 | End: 2021-10-28 | Stop reason: HOSPADM

## 2021-10-28 RX ORDER — DIPHENHYDRAMINE HYDROCHLORIDE 50 MG/ML
12.5 INJECTION, SOLUTION INTRAMUSCULAR; INTRAVENOUS AS NEEDED
Status: DISCONTINUED | OUTPATIENT
Start: 2021-10-28 | End: 2021-10-28

## 2021-10-28 RX ORDER — ONDANSETRON 2 MG/ML
INJECTION INTRAMUSCULAR; INTRAVENOUS AS NEEDED
Status: DISCONTINUED | OUTPATIENT
Start: 2021-10-28 | End: 2021-10-28 | Stop reason: HOSPADM

## 2021-10-28 RX ORDER — FENTANYL CITRATE 50 UG/ML
INJECTION, SOLUTION INTRAMUSCULAR; INTRAVENOUS AS NEEDED
Status: DISCONTINUED | OUTPATIENT
Start: 2021-10-28 | End: 2021-10-28 | Stop reason: HOSPADM

## 2021-10-28 RX ORDER — SODIUM CHLORIDE 9 MG/ML
1000 INJECTION, SOLUTION INTRAVENOUS CONTINUOUS
Status: DISCONTINUED | OUTPATIENT
Start: 2021-10-28 | End: 2021-10-28

## 2021-10-28 RX ORDER — EPHEDRINE SULFATE/0.9% NACL/PF 50 MG/5 ML
5 SYRINGE (ML) INTRAVENOUS AS NEEDED
Status: DISCONTINUED | OUTPATIENT
Start: 2021-10-28 | End: 2021-10-28

## 2021-10-28 RX ORDER — HYDROMORPHONE HYDROCHLORIDE 1 MG/ML
0.2 INJECTION, SOLUTION INTRAMUSCULAR; INTRAVENOUS; SUBCUTANEOUS
Status: DISCONTINUED | OUTPATIENT
Start: 2021-10-28 | End: 2021-10-28

## 2021-10-28 RX ORDER — MORPHINE SULFATE 2 MG/ML
2 INJECTION, SOLUTION INTRAMUSCULAR; INTRAVENOUS
Status: DISCONTINUED | OUTPATIENT
Start: 2021-10-28 | End: 2021-10-28

## 2021-10-28 RX ORDER — ONDANSETRON 2 MG/ML
4 INJECTION INTRAMUSCULAR; INTRAVENOUS AS NEEDED
Status: DISCONTINUED | OUTPATIENT
Start: 2021-10-28 | End: 2021-10-28

## 2021-10-28 RX ORDER — MELATONIN
1000 DAILY
Status: DISCONTINUED | OUTPATIENT
Start: 2021-10-29 | End: 2021-10-29 | Stop reason: HOSPADM

## 2021-10-28 RX ORDER — ACETAMINOPHEN 500 MG
1000 TABLET ORAL ONCE
Status: COMPLETED | OUTPATIENT
Start: 2021-10-28 | End: 2021-10-28

## 2021-10-28 RX ORDER — ROCURONIUM BROMIDE 10 MG/ML
INJECTION, SOLUTION INTRAVENOUS AS NEEDED
Status: DISCONTINUED | OUTPATIENT
Start: 2021-10-28 | End: 2021-10-28 | Stop reason: HOSPADM

## 2021-10-28 RX ORDER — DEXTROSE 50 % IN WATER (D50W) INTRAVENOUS SYRINGE
12.5-25 AS NEEDED
Status: DISCONTINUED | OUTPATIENT
Start: 2021-10-28 | End: 2021-10-29 | Stop reason: HOSPADM

## 2021-10-28 RX ORDER — SODIUM CHLORIDE 0.9 % (FLUSH) 0.9 %
5-40 SYRINGE (ML) INJECTION EVERY 8 HOURS
Status: DISCONTINUED | OUTPATIENT
Start: 2021-10-28 | End: 2021-10-28

## 2021-10-28 RX ORDER — SODIUM CHLORIDE, SODIUM LACTATE, POTASSIUM CHLORIDE, CALCIUM CHLORIDE 600; 310; 30; 20 MG/100ML; MG/100ML; MG/100ML; MG/100ML
1000 INJECTION, SOLUTION INTRAVENOUS CONTINUOUS
Status: DISCONTINUED | OUTPATIENT
Start: 2021-10-28 | End: 2021-10-28

## 2021-10-28 RX ORDER — BUMETANIDE 1 MG/1
0.5 TABLET ORAL
Status: DISCONTINUED | OUTPATIENT
Start: 2021-10-29 | End: 2021-10-29 | Stop reason: HOSPADM

## 2021-10-28 RX ORDER — FENTANYL CITRATE 50 UG/ML
50 INJECTION, SOLUTION INTRAMUSCULAR; INTRAVENOUS AS NEEDED
Status: DISCONTINUED | OUTPATIENT
Start: 2021-10-28 | End: 2021-10-28 | Stop reason: HOSPADM

## 2021-10-28 RX ORDER — FENTANYL CITRATE 50 UG/ML
25 INJECTION, SOLUTION INTRAMUSCULAR; INTRAVENOUS
Status: DISCONTINUED | OUTPATIENT
Start: 2021-10-28 | End: 2021-10-28

## 2021-10-28 RX ORDER — SODIUM CHLORIDE 9 MG/ML
125 INJECTION, SOLUTION INTRAVENOUS CONTINUOUS
Status: DISCONTINUED | OUTPATIENT
Start: 2021-10-28 | End: 2021-10-28 | Stop reason: HOSPADM

## 2021-10-28 RX ORDER — MAGNESIUM SULFATE 100 %
4 CRYSTALS MISCELLANEOUS AS NEEDED
Status: DISCONTINUED | OUTPATIENT
Start: 2021-10-28 | End: 2021-10-29 | Stop reason: HOSPADM

## 2021-10-28 RX ORDER — EPHEDRINE SULFATE/0.9% NACL/PF 50 MG/5 ML
SYRINGE (ML) INTRAVENOUS AS NEEDED
Status: DISCONTINUED | OUTPATIENT
Start: 2021-10-28 | End: 2021-10-28

## 2021-10-28 RX ORDER — OXYCODONE AND ACETAMINOPHEN 5; 325 MG/1; MG/1
1 TABLET ORAL AS NEEDED
Status: DISCONTINUED | OUTPATIENT
Start: 2021-10-28 | End: 2021-10-28

## 2021-10-28 RX ADMIN — DEXMEDETOMIDINE HYDROCHLORIDE 4 MCG: 100 INJECTION, SOLUTION, CONCENTRATE INTRAVENOUS at 15:45

## 2021-10-28 RX ADMIN — FENTANYL CITRATE 50 MCG: 50 INJECTION, SOLUTION INTRAMUSCULAR; INTRAVENOUS at 14:18

## 2021-10-28 RX ADMIN — Medication 80 MCG: at 15:14

## 2021-10-28 RX ADMIN — WATER 2 G: 1 INJECTION INTRAMUSCULAR; INTRAVENOUS; SUBCUTANEOUS at 14:26

## 2021-10-28 RX ADMIN — NEOSTIGMINE METHYLSULFATE 3 MG: 1 INJECTION, SOLUTION INTRAVENOUS at 15:46

## 2021-10-28 RX ADMIN — LIDOCAINE HYDROCHLORIDE 100 MG: 20 INJECTION, SOLUTION EPIDURAL; INFILTRATION; INTRACAUDAL; PERINEURAL at 14:18

## 2021-10-28 RX ADMIN — ROCURONIUM BROMIDE 15 MG: 10 SOLUTION INTRAVENOUS at 14:43

## 2021-10-28 RX ADMIN — Medication 80 MCG: at 15:17

## 2021-10-28 RX ADMIN — ROSUVASTATIN 40 MG: 40 TABLET, FILM COATED ORAL at 22:17

## 2021-10-28 RX ADMIN — Medication 120 MCG: at 15:29

## 2021-10-28 RX ADMIN — SODIUM CHLORIDE 125 ML/HR: 900 INJECTION, SOLUTION INTRAVENOUS at 12:37

## 2021-10-28 RX ADMIN — Medication 120 MCG: at 14:38

## 2021-10-28 RX ADMIN — Medication 120 MCG: at 14:36

## 2021-10-28 RX ADMIN — OXYCODONE AND ACETAMINOPHEN 1 TABLET: 5; 325 TABLET ORAL at 20:27

## 2021-10-28 RX ADMIN — FENTANYL CITRATE 50 MCG: 50 INJECTION, SOLUTION INTRAMUSCULAR; INTRAVENOUS at 15:20

## 2021-10-28 RX ADMIN — ROCURONIUM BROMIDE 5 MG: 10 SOLUTION INTRAVENOUS at 14:18

## 2021-10-28 RX ADMIN — DEXMEDETOMIDINE HYDROCHLORIDE 8 MCG: 100 INJECTION, SOLUTION, CONCENTRATE INTRAVENOUS at 15:20

## 2021-10-28 RX ADMIN — ONDANSETRON HYDROCHLORIDE 4 MG: 2 INJECTION, SOLUTION INTRAMUSCULAR; INTRAVENOUS at 15:37

## 2021-10-28 RX ADMIN — Medication 10 ML: at 16:38

## 2021-10-28 RX ADMIN — FENTANYL CITRATE 25 MCG: 50 INJECTION INTRAMUSCULAR; INTRAVENOUS at 17:05

## 2021-10-28 RX ADMIN — Medication 5 MG: at 14:44

## 2021-10-28 RX ADMIN — HUMAN INSULIN 3 UNITS: 100 INJECTION, SOLUTION SUBCUTANEOUS at 22:22

## 2021-10-28 RX ADMIN — Medication 80 MCG: at 14:44

## 2021-10-28 RX ADMIN — SUCCINYLCHOLINE CHLORIDE 120 MG: 20 INJECTION, SOLUTION INTRAMUSCULAR; INTRAVENOUS at 14:19

## 2021-10-28 RX ADMIN — Medication 80 MCG: at 14:41

## 2021-10-28 RX ADMIN — FENTANYL CITRATE 25 MCG: 50 INJECTION INTRAMUSCULAR; INTRAVENOUS at 16:35

## 2021-10-28 RX ADMIN — PROPOFOL 150 MG: 10 INJECTION, EMULSION INTRAVENOUS at 14:18

## 2021-10-28 RX ADMIN — MIDAZOLAM 2 MG: 1 INJECTION INTRAMUSCULAR; INTRAVENOUS at 14:10

## 2021-10-28 RX ADMIN — ACETAMINOPHEN 1000 MG: 500 TABLET ORAL at 12:41

## 2021-10-28 RX ADMIN — FENTANYL CITRATE 25 MCG: 50 INJECTION INTRAMUSCULAR; INTRAVENOUS at 16:40

## 2021-10-28 RX ADMIN — GLYCOPYRROLATE 0.6 MG: 0.2 INJECTION, SOLUTION INTRAMUSCULAR; INTRAVENOUS at 15:46

## 2021-10-28 RX ADMIN — DEXAMETHASONE SODIUM PHOSPHATE 10 MG: 4 INJECTION, SOLUTION INTRAMUSCULAR; INTRAVENOUS at 14:35

## 2021-10-28 NOTE — DISCHARGE INSTRUCTIONS
Patient Education        Parathyroidectomy: What to Expect at Home  Your Recovery     Parathyroidectomy is the removal of one or more of the four parathyroid glands in the neck. These small glands, located on the thyroid gland, help control the amount of calcium in the body. When they are too active, these glands cause high levels of calcium. This is called hyperparathyroidism (say \"hy-per-pair-rd-KRP-vztc-iz-um\"). You may leave the hospital with stitches in the cut the doctor made (incision). Your doctor will tell you if you need to come back to have these removed. You may still have a tube called a drain in your neck. Your doctor will take this out a few days after your surgery. You may have some trouble chewing and swallowing after you go home. Your voice probably will be hoarse, and you may have trouble talking. For most people, these problems get better within a few weeks, but it can take longer. In some cases, this surgery causes permanent problems with chewing, speaking, or swallowing. This care sheet gives you a general idea about how long it will take for you to recover. But each person recovers at a different pace. Follow the steps below to get better as quickly as possible. How can you care for yourself at home? Activity    · Rest when you feel tired. Getting enough sleep will help you recover. When you lie down, put two or three pillows under your head to keep it raised.     · Try to walk each day. Start by walking a little more than you did the day before. Bit by bit, increase the amount you walk. Walking boosts blood flow and helps prevent pneumonia and constipation.     · Avoid strenuous physical activity and lifting heavy objects for 3 weeks after surgery or until your doctor says it is okay.     · Do not over-extend your neck backwards for 2 weeks after surgery.     · Ask your doctor when you can drive again.     · You may take a shower, unless you still have a drain. Pat the incision dry.  If you have a drain coming out of your incision, follow your doctor's instructions to care for it. Diet    · If swallowing is painful, start out with cold drinks, flavored ice pops, and ice cream. Next, try soft foods like pudding, yogurt, canned or cooked fruit, scrambled eggs, and mashed potatoes. Avoid eating hard or scratchy foods like chips or raw vegetables. Avoid orange or tomato juice and other acidic foods that can sting the throat.     · If you cough right after drinking, try drinking thicker liquids, such as a smoothie.     · You may notice that your bowel movements are not regular right after your surgery. This is common. Try to avoid constipation and straining with bowel movements. You may want to take a fiber supplement every day. If you have not had a bowel movement after a couple of days, ask your doctor about taking a mild laxative. Medicines    · Your doctor will tell you if and when you can restart your medicines. He or she will also give you instructions about taking any new medicines.     · If you take aspirin or some other blood thinner, ask your doctor if and when to start taking it again. Make sure that you understand exactly what your doctor wants you to do.     · Be safe with medicines. Take pain medicines exactly as directed. ? If the doctor gave you a prescription medicine for pain, take it as prescribed. ? If you are not taking a prescription pain medicine, ask your doctor if you can take an over-the-counter medicine.     · If you think your pain medicine is making you sick to your stomach:  ? Take your medicine after meals (unless your doctor has told you not to). ? Ask your doctor for a different pain medicine.     · Your doctor may prescribe calcium to prevent problems after surgery from low calcium. Not having enough calcium can cause symptoms such as tingling around your mouth or in your hands and feet.     · Your doctor may have prescribed antibiotics. Take them as directed.  Do not stop taking them just because you feel better. You need to take the full course of antibiotics. Incision care    · If your doctor told you how to care for your incision, follow your doctor's instructions. If you did not get instructions, follow this general advice:  ? After the first 24 to 48 hours, wash around the incision with clean water 2 times a day. Don't use hydrogen peroxide or alcohol, which can slow healing.     · You may have a drain near your incision. Your doctor will tell you how to take care of it. Follow-up care is a key part of your treatment and safety. Be sure to make and go to all appointments, and call your doctor if you are having problems. It's also a good idea to know your test results and keep a list of the medicines you take. When should you call for help? Call 911 anytime you think you may need emergency care. For example, call if:    · You passed out (lost consciousness).     · You have sudden chest pain and shortness of breath, or you cough up blood.     · You have severe trouble breathing. Call your doctor now or seek immediate medical care if:    · You have loose stitches, or your incision comes open.     · You have blood leaking from your incision.     · You have signs of infection, such as:  ? Increased pain, swelling, warmth, or redness. ? Red streaks leading from the incision. ? Pus draining from the incision. ? A fever.     · You have a tingling feeling around your mouth.     · You have cramping or tingling in your hands and feet. Watch closely for changes in your health, and be sure to contact your doctor if:    · You have trouble talking.     · You are sick to your stomach or cannot keep fluids down.     · You do not have a bowel movement after taking a laxative. Where can you learn more? Go to http://www.gray.com/  Enter M010 in the search box to learn more about \"Parathyroidectomy: What to Expect at Home. \"  Current as of: December 2, 2020               Content Version: 13.0  © 6082-1504 Healthwise, Incorporated. Care instructions adapted under license by [employer] (which disclaims liability or warranty for this information). If you have questions about a medical condition or this instruction, always ask your healthcare professional. [address] any warranty or liability for your use of this information.

## 2021-10-28 NOTE — ANESTHESIA PREPROCEDURE EVALUATION
Relevant Problems   No relevant active problems       Anesthetic History   No history of anesthetic complications            Review of Systems / Medical History  Patient summary reviewed, nursing notes reviewed and pertinent labs reviewed    Pulmonary            Asthma        Neuro/Psych   Within defined limits           Cardiovascular    Hypertension          Hyperlipidemia         GI/Hepatic/Renal     GERD           Endo/Other    Diabetes  Hypothyroidism  Obesity     Other Findings              Physical Exam    Airway  Mallampati: II  TM Distance: 4 - 6 cm  Neck ROM: normal range of motion   Mouth opening: Normal     Cardiovascular    Rhythm: regular  Rate: normal         Dental  No notable dental hx       Pulmonary  Breath sounds clear to auscultation               Abdominal  GI exam deferred       Other Findings            Anesthetic Plan    ASA: 3  Anesthesia type: general          Induction: Intravenous  Anesthetic plan and risks discussed with: Patient

## 2021-10-28 NOTE — H&P
History and Physical    Subjective:     Ghada Bravo is a 61 y.o.  female who presents with right thyroid nodule, atypical by FNA, with background primary hyperparathyroidism. Past Medical History:   Diagnosis Date    Adverse effect of anesthesia     UNKNOWN - MOTHER HAD SURGERY THAT HAD TO BE STOPPED DUE TO COMPLICATIONS WITH ANESTHESIA    Asthma     HX OF. NO ATTACKS 21 YRS    Diabetes ([facility]     TYPE II    Fatty liver     Gallstones     GERD (gastroesophageal reflux disease)     Hernia, abdominal     Kidney stone     Nodule of right lobe of thyroid gland     Parathyroid disorder ([facility]     Thyroid disease       Past Surgical History:   Procedure Laterality Date    HX BLADDER SUSPENSION  08/2020    ANTERIOR & POSTERIOR. DR. Tez Vega    HX COLONOSCOPY      HX HYSTERECTOMY  11/14/2016    HX OTHER SURGICAL Bilateral 2015    EYE LIFT    HX WISDOM TEETH EXTRACTION      15 YO     Family History   Problem Relation Age of Onset    Breast Cancer Sister 77    Cancer Mother         RENAL    Alcohol abuse Father     Heart Disease Father         MI      Social History     Tobacco Use    Smoking status: Never Smoker    Smokeless tobacco: Never Used   Substance Use Topics    Alcohol use: Yes     Alcohol/week: 2.0 standard drinks     Types: 1 Glasses of wine, 1 Cans of beer per week     Comment: ON OCCASION       Prior to Admission medications    Medication Sig Start Date End Date Taking? Authorizing Provider   ascorbic acid (VITAMIN C PO) Take 1 Tablet by mouth every morning. Yes Provider, Historical   metFORMIN (GLUCOPHAGE) 500 mg tablet Take 500 mg by mouth two (2) times daily (with meals). Yes Provider, Historical   vitamin E acetate (VITAMIN E PO) Take 1 Capsule by mouth daily. Yes Provider, Historical   insulin glargine U-300 conc (TOUJEO) 300 unit/mL (1.5 mL) inpn pen 10 Units by SubCUTAneous route daily.     Provider, Historical   empagliflozin (Jardiance) 10 mg tablet Take 10 mg by mouth daily. Provider, Historical   semaglutide (OZEMPIC) 0.25 mg/0.2 mL (2 mg/1.5 mL) sub-q pen 0.25 mg by SubCUTAneous route every seven (7) days. TAKES ON SATURDAY    Provider, Historical   bumetanide (BUMEX) 0.5 mg tablet Take 0.5 mg by mouth every morning. Provider, Historical   rosuvastatin (CRESTOR) 40 mg tablet Take 40 mg by mouth nightly. Provider, Historical   ergocalciferol, vitamin D2, (VITAMIN D2 PO) Take 1 Capsule by mouth every morning. Provider, Historical     No Known Allergies     Review of Systems:  A comprehensive review of systems was negative except for that written in the History of Present Illness. Objective: Intake and Output:    No intake/output data recorded. No intake/output data recorded. Physical Exam:   Visit Vitals  /78 (BP 1 Location: Right arm, BP Patient Position: At rest)   Pulse 76   Temp 98.2 °F (36.8 °C)   Resp 18   Ht 5' 7\" (1.702 m)   Wt 90.3 kg (199 lb 1.2 oz)   SpO2 98%   BMI 31.18 kg/m²     General:  Alert, cooperative, no distress, appears stated age. Head:  Normocephalic, without obvious abnormality, atraumatic. Eyes:  Conjunctivae/corneas clear. PERRL, EOMs intact. Fundi benign. Ears:  Normal TMs and external ear canals both ears. Nose: Nares normal. Septum midline. Mucosa normal. No drainage or sinus tenderness. Throat: Lips, mucosa, and tongue normal. Teeth and gums normal.   Neck: Supple, symmetrical, trachea midline, no adenopathy, thyroid: no enlargement/tenderness/nodules, no carotid bruit and no JVD. Back:   Symmetric, no curvature. ROM normal. No CVA tenderness. Lungs:   Clear to auscultation bilaterally. Chest wall:  No tenderness or deformity. Heart:  Regular rate and rhythm, S1, S2 normal, no murmur, click, rub or gallop. Extremities: Extremities normal, atraumatic, no cyanosis or edema. Pulses: 2+ and symmetric all extremities.    Skin: Skin color, texture, turgor normal. No rashes or lesions. Lymph nodes: Cervical, supraclavicular, and axillary nodes normal.   Neurologic: CNII-XII intact. Normal strength, sensation and reflexes throughout.          Data Review:   Recent Results (from the past 24 hour(s))   GLUCOSE, POC    Collection Time: 10/28/21 12:36 PM   Result Value Ref Range    Glucose (POC) 107 65 - 117 mg/dL    Performed by Jesús Constantino          Assessment:     Active Problems:    Right thyroid nodule (10/28/2021)      Primary hyperparathyroidism ([address] (10/28/2021)        Plan:     Right thyroid lobectomy  Parathyroid exploration    Signed By: Qi Alcantar MD     October 28, 2021

## 2021-10-28 NOTE — ANESTHESIA POSTPROCEDURE EVALUATION
Procedure(s):  RIGHT TOTAL THYROID LOBECTOMY/ WITH PARATHYROID EXPLORATION. general    Anesthesia Post Evaluation      Multimodal analgesia: multimodal analgesia used between 6 hours prior to anesthesia start to PACU discharge  Patient location during evaluation: bedside  Patient participation: complete - patient participated  Level of consciousness: awake  Pain score: 0  Pain management: satisfactory to patient  Airway patency: patent  Anesthetic complications: no  Cardiovascular status: acceptable and blood pressure returned to baseline  Respiratory status: acceptable  Hydration status: acceptable  Comments: I have evaluated the patient and meets criteria for discharge from PACU. Lisette Ireland DO. Post anesthesia nausea and vomiting:  none  Final Post Anesthesia Temperature Assessment:  Normothermia (36.0-37.5 degrees C)      INITIAL Post-op Vital signs:   Vitals Value Taken Time   /59 10/28/21 1620   Temp 35.8 °C (96.4 °F) 10/28/21 1606   Pulse 51 10/28/21 1624   Resp 12 10/28/21 1624   SpO2 97 % 10/28/21 1624   Vitals shown include unvalidated device data.

## 2021-10-28 NOTE — PERIOP NOTES
Pre Excision IOPTHC lab sent at 1500    10 minute post excision IOPTHD sent at 1512    Right inferior parathyroid mass frozen section sent at 1459. Waiting on results of all.      Right pre-cervical mass frozen section sent at [phone], waiting on results

## 2021-10-28 NOTE — PERIOP NOTES
TRANSFER - OUT REPORT:    Verbal report given to Niurka Baez RN on Daniel Luz  being transferred to [facility], Room 305 for routine post - op       Report consisted of patients Situation, Background, Assessment and   Recommendations(SBAR). Time Pre op antibiotic given: 14:26  Anesthesia Stop time: 16:07  Maradiaga Present on Transfer to floor: No    Information from the following report(s) SBAR, Procedure Summary, Intake/Output and Cardiac Rhythm NSR/Sinus Liz Cassette was reviewed with the receiving nurse. Opportunity for questions and clarification was provided. Is the patient on 02? NO    Is the patient on a monitor? NO    Is the nurse transporting with the patient? NO    Surgical Waiting Area notified of patient's transfer from PACU? YES      The following personal items collected during your admission accompanied patient upon transfer:   Dental Appliance:    Vision:    Hearing Aid:    Jewelry:    Clothing: Clothing: Other (comment) (clothing bag to pacu) Clothing/belonging bag returned to patient, on stretcher prior to leaving PACU. Other Valuables: Other Valuables: Eyeglasses (glasses to pacu) Glassess with patient on departure from PACU.   Valuables sent to safe:

## 2021-10-29 VITALS
HEART RATE: 64 BPM | TEMPERATURE: 98.3 F | DIASTOLIC BLOOD PRESSURE: 63 MMHG | HEIGHT: 67 IN | BODY MASS INDEX: 31.25 KG/M2 | SYSTOLIC BLOOD PRESSURE: 108 MMHG | OXYGEN SATURATION: 97 % | WEIGHT: 199.08 LBS | RESPIRATION RATE: 18 BRPM

## 2021-10-29 LAB
CALCIUM SERPL-MCNC: 9.1 MG/DL (ref 8.5–10.1)
GLUCOSE BLD STRIP.AUTO-MCNC: 112 MG/DL (ref 65–117)
SERVICE CMNT-IMP: NORMAL

## 2021-10-29 PROCEDURE — 74011250637 HC RX REV CODE- 250/637: Performed by: OTOLARYNGOLOGY

## 2021-10-29 PROCEDURE — 82962 GLUCOSE BLOOD TEST: CPT

## 2021-10-29 PROCEDURE — 82310 ASSAY OF CALCIUM: CPT

## 2021-10-29 PROCEDURE — 36415 COLL VENOUS BLD VENIPUNCTURE: CPT

## 2021-10-29 RX ORDER — OXYCODONE AND ACETAMINOPHEN 5; 325 MG/1; MG/1
1 TABLET ORAL
Qty: 30 TABLET | Refills: 0 | Status: SHIPPED | OUTPATIENT
Start: 2021-10-29 | End: 2021-11-05

## 2021-10-29 RX ORDER — FERROUS SULFATE, DRIED 160(50) MG
2 TABLET, EXTENDED RELEASE ORAL
Qty: 65 TABLET | Refills: 1 | Status: SHIPPED | OUTPATIENT
Start: 2021-10-29 | End: 2021-11-19

## 2021-10-29 RX ADMIN — BUMETANIDE 0.5 MG: 1 TABLET ORAL at 07:03

## 2021-10-29 RX ADMIN — METFORMIN HYDROCHLORIDE 500 MG: 500 TABLET ORAL at 08:53

## 2021-10-29 RX ADMIN — OXYCODONE AND ACETAMINOPHEN 1 TABLET: 5; 325 TABLET ORAL at 03:28

## 2021-10-29 RX ADMIN — Medication 1000 UNITS: at 08:53

## 2021-10-29 NOTE — PROGRESS NOTES
The following SGLT2 inhibitor has been discontinued per [facility] approved policy:    empagliflozin 10 mg daily     Please reorder upon discharge if appropriate.

## 2021-10-29 NOTE — PROGRESS NOTES
Otolaryngology, Head and Neck Surgery        The patient is post operative day 1 from thyroid and parathyroid surgery. she is doing well and is tolerating a diet and the pain is under control. The patient denies any chest pain or shortness of breath. Hospital Problems  Never Reviewed        Codes Class Noted POA    Right thyroid nodule ICD-10-CM: E04.1  ICD-9-CM: 241.0  10/28/2021 Unknown        Primary hyperparathyroidism ([facility] [address] ICD-10-CM: E21.0  ICD-9-CM: 252.01  10/28/2021 Unknown              Current Facility-Administered Medications   Medication Dose Route Frequency    metFORMIN (GLUCOPHAGE) tablet 500 mg  500 mg Oral BID WITH MEALS    . PHARMACY TO SUBSTITUTE PER PROTOCOL (Reordered from: semaglutide (OZEMPIC) 0.25 mg/0.2 mL (2 mg/1.5 mL) sub-q pen)    Per Protocol    bumetanide (BUMEX) tablet 0.5 mg  0.5 mg Oral 7am    rosuvastatin (CRESTOR) tablet 40 mg  40 mg Oral QHS    cholecalciferol (VITAMIN D3) (1000 Units /25 mcg) tablet 1,000 Units  1,000 Units Oral DAILY    insulin glargine (LANTUS) injection 8 Units  8 Units SubCUTAneous DAILY    insulin regular (NOVOLIN R, HUMULIN R) injection   SubCUTAneous AC&HS    glucose chewable tablet 16 g  4 Tablet Oral PRN    dextrose (D50W) injection syrg 12.5-25 g  12.5-25 g IntraVENous PRN    glucagon (GLUCAGEN) injection 1 mg  1 mg IntraMUSCular PRN    oxyCODONE-acetaminophen (PERCOCET) 5-325 mg per tablet 2 Tablet  2 Tablet Oral Q6H PRN    morphine injection 2 mg  2 mg IntraVENous Q2H PRN       Recent Results (from the past 24 hour(s))   GLUCOSE, POC    Collection Time: 10/28/21 12:36 PM   Result Value Ref Range    Glucose (POC) 107 65 - 117 mg/dL    Performed by Ivory MERINO, PRE-PREP, INTRA-OP    Collection Time: 10/28/21  2:01 PM   Result Value Ref Range    Time drawn 1,401      Intra-op PTH 67.8 18.4 - 88.0 pg/mL   PTH, PRE-EXCISION, INTRA-OP    Collection Time: 10/28/21  3:00 PM   Result Value Ref Range    Time drawn 1,500      Intra-op PTH 35.7 18.4 - 88.0 pg/mL   PTH, 10 MIN. POST-EXCISION, INTRA-OP    Collection Time: 10/28/21  3:12 PM   Result Value Ref Range    Time drawn 1,512      Intra-op PTH 18.1 (L) 18.4 - 88.0 pg/mL    Absolute % decrease (NOTE) %   GLUCOSE, POC    Collection Time: 10/28/21  4:42 PM   Result Value Ref Range    Glucose (POC) 117 65 - 117 mg/dL    Performed by Carla Mueller    CALCIUM    Collection Time: 10/28/21  7:27 PM   Result Value Ref Range    Calcium 9.3 8.5 - 10.1 MG/DL   GLUCOSE, POC    Collection Time: 10/28/21 10:18 PM   Result Value Ref Range    Glucose (POC) 288 (H) 65 - 117 mg/dL    Performed by Aster Escalera    CALCIUM    Collection Time: 10/29/21  3:25 AM   Result Value Ref Range    Calcium 9.1 8.5 - 10.1 MG/DL   GLUCOSE, POC    Collection Time: 10/29/21  7:05 AM   Result Value Ref Range    Glucose (POC) 112 65 - 117 mg/dL    Performed by Woo Buckley            Visit Vitals  /63 (BP 1 Location: Left upper arm, BP Patient Position: At rest;Sitting)   Pulse 64   Temp 98.3 °F (36.8 °C)   Resp 18   Ht 5' 7\" (1.702 m)   Wt 90.3 kg (199 lb 1.2 oz)   SpO2 97%   BMI 31.18 kg/m²       Intake/Output Summary (Last 24 hours) at 10/29/2021 1037  Last data filed at 10/29/2021 0703  Gross per 24 hour   Intake 1400 ml   Output 30 ml   Net 1370 ml       The patient is a well developed, well nourished adult in no distress    Neck: incision intact, no swelling or increased erythema or induration      Chest: Clear to auscultation bilaterally. No wheezes or crackles  Cardiovascular: Regular rate and rhythm    Lower extremities: no calf tenderness    A:   Hospital Problems  Never Reviewed        Codes Class Noted POA    Right thyroid nodule ICD-10-CM: E04.1  ICD-9-CM: 241.0  10/28/2021 Unknown        Primary hyperparathyroidism ([facility] [address] ICD-10-CM: E21.0  ICD-9-CM: 252.01  10/28/2021 Unknown                  Plan:  D/c home  OscalD supplementation  Wound care discussed.    Await pathology

## 2021-10-29 NOTE — PROGRESS NOTES
Bedside and Verbal shift change report given to JOSHUA Gonzalez RN (oncoming nurse) by Jonny Thompson RN (offgoing nurse). Report included the following information SBAR, Kardex and OR Summary.

## 2021-11-01 NOTE — OP NOTES
[address] [facility]  OPERATIVE REPORT    Name:  Delano Reynolds  MR#:  [id number]  :  1955  ACCOUNT #:  [de-identified]  DATE OF SERVICE:  10/28/2021    PREOPERATIVE DIAGNOSES:  1. Atypical, dominant thyroid mass on the right. 2.  Primary hyperparathyroidism. POSTOPERATIVE DIAGNOSES:  1. Atypical, dominant thyroid mass on the right. 2.  Primary hyperparathyroidism. 3.  Right deep cervical mass. PROCEDURES PERFORMED:  1. Right thyroid lobectomy. 2.  Bilateral parathyroid exploration. 3.  Excision of deep cervical, subfascial mass, prevertebral.    SURGEON:  Prabhu Gallegos MD    ASSISTANT:  Surgical assistant. ANESTHESIA:  General endotracheal tube anesthesia. COMPLICATIONS:  No complication. SPECIMENS REMOVED:  Right thyroid lobe, right inferior parathyroid mass, and right deep cervical mass. IMPLANTS:  No implant. ESTIMATED BLOOD LOSS:  20 mL. INDICATIONS AND FINDINGS:  The patient is identified as having a thyroid mass with an atypical biopsy on the right, hence indications for thyroid lobectomy. The patient had been investigated for primary hyperparathyroidism without localization of parathyroid mass and hence bilateral exploration, identifying right lower parathyroid mass, frozen section showing hypercellular pathology and intraoperative parathyroid hormone dropping from 80 to 30 to approximately 15 indicating complete resection of abnormal functioning tissue. Additionally, during the surgery, a right deep cervical mass was palpated up against the cervical spine, not typical for mass lesion in this location and hence exploration and resection of a precervical spine deep cervical mass and hence indications. PROCEDURE:  In the operating room, the patient was induced under general endotracheal tube anesthesia following which she was prepped and draped in a sterile fashion. 1% lidocaine with 1:100,000 part epinephrine was used to inject the lower cervical rhytid. Superior and inferior subplatysmal skin flaps were elevated, retracted with silk suture. The strap muscles were divided in the midline, identifying the thyroid gland which was then dissected in an extracapsular fashion to expose the superior and inferior poles, dividing the inferior pole venous vasculature with bipolar and the superior vascular portion of the neurovascular bundles with the same. An extracapsular dissection was performed, identifying the recurrent laryngeal nerve and tracing into the cricothyroid membrane to protect its course. The thyroid could be peeled from the pretracheal fascia, excising the isthmus with the contralateral lobe and handing it off for pathologic analysis. The right superior parathyroid gland was identified, enlarged, a flattened kidney bean but normal otherwise in appearance. A right inferior parathyroid gland was identified, brown, discolored, of a similar kidney bean size. This was mobilized and divided at its hilum with bipolar cautery and handed off for pathologic analysis, returning as a hypercellular gland with time. Intraoperative parathyroid hormone was tested at the appropriate intervals, returning as above. Owing to the large right superior parathyroid gland, contralateral exploration was performed, identifying superior and inferior left parathyroid glands, both enlarged, kidney bean size, but with normal coloration and hence these were left alone. On palpation of the wound, a mass was identified, paraesophageal, sitting on the cervical spine. This was exposed and dissected and seen to be discolored and glistening. This was dissected out with a [employer], using the bipolar to resect this and handed off for pathologic analysis given its abnormal presence and appearance. The wound was irrigated. A left-sided LAXMI drain was placed, secured with a nylon suture.   3-0 Vicryl was used to close the strap muscles in the midline, using the same to close the platysma flaps. The skin was then closed with an intracuticular suture of 5-0 Monocryl, reinforced with Mastisol and Steri-Strips. The patient was then handed over to Anesthesia for awakening and transfer to the recovery room.       Fabiano Trujillo MD      [id number]  D:  11/01/2021 17:55  T:  11/01/2021 19:47  JOB #:  [id number]  CC:  [facility]

## 2021-11-02 ENCOUNTER — VIRTUAL VISIT (OUTPATIENT)
Dept: DIABETES SERVICES | Age: 66
End: 2021-11-02
Payer: COMMERCIAL

## 2021-11-02 DIAGNOSIS — E11.65 TYPE 2 DIABETES MELLITUS WITH HYPERGLYCEMIA, UNSPECIFIED WHETHER LONG TERM INSULIN USE (HCC): Primary | ICD-10-CM

## 2021-11-02 PROCEDURE — G0108 DIAB MANAGE TRN  PER INDIV: HCPCS | Performed by: DIETITIAN, REGISTERED

## 2021-11-02 NOTE — PROGRESS NOTES
[employer] [facility] for Diabetes [facility]  Diabetes Self-Management Education & Support Program  Pre-program Assessment and Education    Reason for Referral: Pre-Op A1c >8.4%  Referral Source: Julian Cooper NP  Services requested: DSMES    ASSESSMENT     From my perspective, the participant would benefit from [facility] SYSTEM specifically related to Healthy coping, Healthy eating, Monitoring, Taking medications and Problem solving. Will adapt DSMES program to build on participant's strengths in skills score, strengths in confidence score and strengths in preparedness score as noted in the Diabetes Skills, Confidence, and Preparedness Index. During the program, we will focus on providing DSMES that specifically addresses participant's interest in Healthy eating, as shown by their reported readiness to change. The participant would be best served by attending individual sessions. Zelda shared that she is seeing  for her DM management and was recently started on Jardiance (replacing Invokana) and has seen a drop in A1c from 9.1 to 8.4% recently. She shared that she has been started on Vitamin D and Ca supplements after her recent surgery - taking 2 tabs tid = 150% of daily needs for Vitamin D. She shared that she has been working from home over the past year and will likely retire from her job working from home. Shared that this has disrupted her usual walks at lunch; shared that her Apple watch is reminding her to move and take breathing breaks to help with stress. Zelda shared that she was active with  with his program but could not afford the program - acknowledges that she was paying more attention to her carb intakes with meals. She is snacking more and lunch is often salad or snacks from her basket. Suggestions provided today:  1) Encouraged Zelda to reach out to provider about her Vitamin D dose (taking as coverage for deficiency and as post op meds x3 weeks).  Confirm that patient needs to take both doses of fat soluble vitamin. 2) Reviewed her medication actions and how they will affect her BG control and the defects w/ Type 2 DM. 3) Encouraged her to work towards her immediate goal to start her walking with her friend and continue with her mini exercise breaks with her Apple watch. 4) may benefit from paying attention to how many times snacking happens through the day. 5) Discussed using seltzer tirdao instead of regular soda when looking for carbonation. Diabetes Self-Management Education Follow-up Visit: 11/18/21       Clinical Presentation  Tova Galvez is a 61 y.o.  female referred for diabetes self-management education. Participant has Type 2 DM on insulin for 11-20 years. Family history positive for diabetes. Patient reports receiving DSMES services in the past. Most recent A1c value:   Lab Results   Component Value Date/Time    Hemoglobin A1c 8.4 (H) 10/25/2021 11:09 AM       Diabetes-related medical history:  Acute complications, Neurological complications - none  Microvascular disease  Retinopathy  Macrovascular disease, Other associated conditions- none      Diabetes-related medications:  Current dosing:   Key Antihyperglycemic Medications             insulin glargine U-300 conc (TOUJEO) 300 unit/mL (1.5 mL) inpn pen 10 Units by SubCUTAneous route daily. empagliflozin (Jardiance) 10 mg tablet Take 10 mg by mouth daily. metFORMIN (GLUCOPHAGE) 500 mg tablet Take 500 mg by mouth two (2) times daily (with meals). semaglutide (OZEMPIC) 0.25 mg/0.2 mL (2 mg/1.5 mL) sub-q pen 0.25 mg by SubCUTAneous route every seven (7) days. TAKES ON SATURDAY          Blood Pressure Management  Key ACE/ARB Medications     Patient is on no ACE or ARB meds. Lipid Management  Key Antihyperlipidemia Meds             rosuvastatin (CRESTOR) 40 mg tablet Take 40 mg by mouth nightly.           Clot Prevention  Key Anti-Platelet Anticoagulant Meds     The patient is on no antiplatelet meds or anticoagulants. Learning Assessment  Learning objectives Educator assessment (11/2/2021)   Diabetes Disease Process  The participant can   A) describe diabetes in basic terms;   B) state the type of diabetes they have; &   C) state accepted blood glucose targets. Healthy Eating  The participant can   A) identify carbohydrate foods; &   B) accurately read food labels. Being Active  The participant can  A) state the benefits of physical activity;  B) report their current PA practices;  C) identify PA they would consider incorporating in their lives; &  D) develop an implementation plan. Monitoring  The participant can  A) operate their blood glucose meter; &  B) describe how they log their blood glucoses to share with their provider. Taking Medications  The participant can  A) name their diabetes medications;  B) state the purpose and dose;  C) note side effects; &  D) describe proper storage, disposal & transport (if appropriate). Healthy Coping  The participant can    A) describe their response to diabetes diagnosis; B) describe their specific coping mechanisms;  C) identify supportive people and/or other resources that positively support their diabetes self-care and health. Reducing Risks  The participant can describe the preventive measures used by providers to promote health and prevent diabetes complications. Problem Solving  The participant can   A) identify signs, symptoms & treatment of hypoglycemia;   B) identify signs, symptoms & treatment of hyperglycemia;  C) describe their sick day plan; &  D) identify BG patterns to discuss with their provider.        Yes  Yes  Yes        Yes, needs review  Yes        Yes  Yes  Yes  Yes        Yes, will review Sushant Bolus with her  Yes          Yes  Yes, reviewed her medications site of action today  Yes  Yes      Yes  Yes  Yes        Yes          Yes, will review  Yes, will review  Yes  Yes     Characteristics to Learning   Barriers to Learning   [] Cognitive loss  [] Mental retardation   [] Intellectual delay/cognitive impairment  [] Psychiatric disorder  [] Visually impaired  [] Hearing loss                 [] Low literacy (difficulty with written text)  [] Low numeracy (difficulty with mathematical information  [] Low health literacy (difficulty with understanding health information & services  [] Language  [] Functional limitation  [] Pain   [] Financial  [] Transportation  [x] None   Favorite Ways to Learn   [] Lecture  [] Slides  [] Reading [] Video-Internet  [] Cassettes/CDs/MP3's  [] Interactive Small Groups [x] Other       Behavioral Assessment  Current self-care practices  Educator assessment (11/2/2021)   Healthy Eating  Current practices  24-hour Dietary Recall:  Breakfast: oatmeal with walnuts/apple  Lunch: salad -lettuce tomato cucumbers, cheese, onion toppers. See snacks. Dinner: chicken/hamburger minh with rice and broccoli. Snacks: potato chips - 1 oz, fruit, protein drinks for breakfast with fruit (Premier). Hummus/salmon dip with crackers, celery and cream cheese. Pretzel things and chocolate covered nuts/almonds, nabs. Beverages: coffee with international creamer, flavored seltzer. Alcohol: with dining out or on the weekend. Times with dinner varies greatly due to working from home. Will have popsicle if she notices she is \"feeling hot\".  Regular soda occasionally 6-10 oz (better than diet)     Would benefit from DSMES related to Healthy Eating: Yes      Eats a carbohydrate controlled diet: Yes      Stage of change: Action   Being Active  Current practices  How many days during the past week have you performed physical activity where your heart beats faster and your breathing is harder than normal for 30 minutes or more?  0 days    How many days in a typical week do you perform activity such as this?  3 days     Would benefit from [facility] related to Being Active: No      Exercises 150 minutes/week: Yes      Stage of change: Maintenance     Monitoring  Current practices  Do you monitor your blood sugar? Yes    How often do you monitor? 3 to 4x/day    What are the range of readings? Using [employer]  Breakfast: 170-150 mg/dL  Lunch: 200's mg/dL  Bedtime: mg/dL    Do you know your last A1c measurement? Yes    Do you know the meaning of the A1c? Yes     Would benefit from [facility] related to Monitoring: Yes, review      Uses BG readings to establish trends and understand BG patterns: Yes      Stage of change: Action   Taking Medication  Current practices  Do you understand what your diabetes medications do? Yes    How often do you miss doses of your diabetes medications? Never    Can you afford your diabetes medications? Yes   Would benefit from [facility] related to Taking Medication: Yes      Takes medications consistently to receive full benefit: Yes      Stage of change: Maintenance       Healthy Coping   Current state  Diabetes Skills, Confidence and Preparedness Index: Total score: 5.7  Skills: 5.2  Confidence: 5.7  Preparedness: 6.4   Would benefit from DSMES related to Healthy Coping: Yes      Identifies specific people, organizations,etc, that actively support their diabetes self-care efforts: Yes      Stage of change: Maintenance     Reducing Risks  Current state  Vaccines:  Influenza: 11/30/21    Pneumococcal: may have received the first pneumococcal.    Hepatitis: There is no immunization history for the selected administration types on file for this patient.     Examinations:  Diabetic Foot and Eye Exam HM Status   Topic Date Due    Diabetic Foot Care  Never done    Eye Exam  Never done        Dental exam: Last appointment was: 06/2021  Eye exam: 07/2021  Foot exam: Last appointment was: 1/2021    Heart Protection:  BP Readings from Last 2 Encounters:   10/29/21 108/63   10/25/21 135/76        No results found for: LDL, LDLC, DLDLP     Kidney Protection:  No results found for: MCACR, MCA1, MCA2, MCA3, MCAU, MCAU2, MCALPOCT     Would benefit from [facility] related to Reducing Risks: No      Actively participates in decision-making with provider regarding secondary prevention:  Yes      Stage of change: Maintenance   Problem Solving  Current state  Hypoglycemia Management:  What are signs and symptoms of hypoglycemia that you experience? Headache, Shaky and Irritability    How do you prevent hypoglycemia? Consistent meals/snack times    How do you treat hypoglycemia? Rule of 15    Hyperglycemia Management:  What are signs and symptoms of hyperglycemia that you experience? slow and sluggish, and sometimes no symptoms. How can you prevent hyperglycemia? Take medication as instructed, Focus on carbohydrate counting/meal planning, Monitor blood glucose    Sick Day Management:  What do you do differently on sick days? Stay hydrated, Check blood glucose every 2-4 hours, Eat meals, soft foods, or drink caloric beverages every 4 hours, Take diabetes medication as instructed by provider, Take over-the-counter medications as instructed by provider    Pattern Management:  Do you notice blood glucose patterns when you look at the readings in your meter or logbook? Yes    How do you use the blood glucose readings from your meter or logbook?  Understand how body responds to meals, Understand how body responds to medications and/or insulin, Adjust meals based on results     Would benefit from [facility] SYSTEM related to Problem Solving: Yes      Articulates appropriate strategies to address hypoglycemia, hyperglycemia, sick day care and BG pattern: Yes      Stage of change: Action     Note: Content derived from the American Association of Diabetes Educators' Diabetes Education Curriculum: A Guide to Successful Self-Management (3rd edition)      Paula Dhillon RD, [facility] on 11/2/2021 at 11:23 AM    I have personally reviewed the health record, including provider notes, laboratory data and current medications before making these care and education recommendations. The time spent in this effort is included in the total time. Total minutes: 72     [id number] [facility] Adaptations for Telehealth:  Wendy Doyle was evaluated through a synchronous (real-time) audio-video encounter. The patient (or guardian if applicable) is aware that this is a billable service. Verbal consent to proceed has been obtained within the past 12 months. The visit was conducted pursuant to the emergency declaration under the [address] [place] [address], [address] [place] [address] and the [employer] Act. Patient identification was verified, and a caregiver was present when  appropriate. The patient was located in a state where the provider was credentialed to provide care. Overall SCPI score: 5.7 Skills Score: 5.2  Low: Blood Sugar Monitoring(Q4),Blood Sugar Monitoring(Q8) Confidence Score: 5.7  Low: Healthy Coping(Q2),Being Active(Q5) Preparedness Score: 6.4  Low: Being Active(Q2),Healthy Coping(Q3),Reducing Risks(Q4),Problem UJTUVCK(M0)  Healthy Eating Score: 6.3  Low: Skills(Q1),Confidence(Q1),Confidence(Q4) Taking Medication Score: 6.0  Low: Skills(Q2) Blood Sugar Monitoring Score: 4.8  Low: Skills(Q4),Skills(Q8) Reducing Risks Score: 6.0  Low: Skills(Q5),Skills(Q9),Confidence(Q3),Preparedness(Q4)  Problem Solving Score: 6.0  Low: Skills(Q6),Confidence(Q7),Preparedness(Q7) Healthy Coping Score: 6.0  Low: Confidence(Q2) Being Active Score: 5.5  Low: Confidence(Q5)    Skills/Knowledge Questions  1. I know how to plan meals that have the best balance between carbohydrates, proteins and vegetables. 6  2. I know how my diabetes medications (pills, injectables and/or insulin) work in my body. 5  3. I know when to check my blood sugar if I want to see how my body responded to a meal. 7  4.  I know when to check my blood sugars to determine if my medication or insulin doses are correct. 2  5. I know what to do to prevent a low blood sugar when I exercise (either before, during, or after). 6  6. When I am sick, I know what to do differently with my diabetes management. 6  7. I know how stress can affect my diabetes management. 7  8. When I look at my blood sugars over a given week, I can explain what my blood sugar pattern is. 2  9. I know what my target levels are for A1c, blood pressure and cholesterol. 6  Confidence Questions  1. I am confident that I can plan balanced meals and snacks. 6  2. I am confident that I can manage my stress. 5  3. I am confident that I can prevent a low blood sugar during or after exercise. 6  4. I am confident that the next time I eat out, I will be able to choose foods that best keep my blood sugars in target. 6  5. I am confident I can include exercise into my schedule. 5  6. I am confident that I can use my daily blood sugars to adjust my diet, my activity, and/or my insulin. 6  7. When something out of my normal routine happens, I am confident that I can problem-solve and keep my diabetes on track. 6  Preparedness Questions  1. Within the next month, I will begin to eat more balanced meals and snacks. 8  2. Within the next month, I will choose an exercise activity and I will start fitting it into my schedule. 6  3. Within the next month, I will make a list of stress management options that work for me. 6  4. Within the next month, I will consistently plan ahead to prevent low blood sugars. 6  5. Within the next month, I will start adjusting my insulin doses on my own. 8  6. Within the next month, I will begin making changes to my diabetes management based on my daily blood sugars (eg - eating, activity and/or insulin). 8  7. Within the next month, I will begin making changes to my diabetes management to meet my overall goals (eg - eating, activity and/or insulin).  6

## 2021-11-18 ENCOUNTER — VIRTUAL VISIT (OUTPATIENT)
Dept: DIABETES SERVICES | Age: 66
End: 2021-11-18
Payer: COMMERCIAL

## 2021-11-18 DIAGNOSIS — E11.65 TYPE 2 DIABETES MELLITUS WITH HYPERGLYCEMIA, UNSPECIFIED WHETHER LONG TERM INSULIN USE (HCC): Primary | ICD-10-CM

## 2021-11-18 PROCEDURE — G0108 DIAB MANAGE TRN  PER INDIV: HCPCS | Performed by: DIETITIAN, REGISTERED

## 2021-11-19 NOTE — PROGRESS NOTES
[facility] Program for Diabetes Health  Diabetes Self-Management Education & Support Program  Encounter note    SUMMARY  Diabetes self-care management training was completed related to Healthy eating. The participant will return on December 6 to continue DSMES related to Healthy eating and Monitoring. The participant did not identify SMART Goal(s):  and will practice knowledge and skills related to healthy eating and monitoring to improve diabetes self-management. EVALUATION:  Jossue Barroso has used multiple methods in the past to help with weight loss and BG management. She wears a [employer] CGM system. We discussed the benefits of her Harrah Roche system affords her the ability to scan for 2 hour pp SG readings, look at trends after meals, review overnight SG. Advised her that she will be able to see if she is insulin resistant in am as her SG should start increasing and her pp SG trends should take some time to return to <180 mg/dl. She admits that she is struggling with energy and concentration during her work days - while she can still follow a very low carb diet if she desires. May benefit from slightly higher carb intakes with breakfast and lunch. Discussed investigating complex carbohydrates - legumes, pastas, breads and fruit with >5g dietary fiber per serving. She is snacking daily - ?stress, ?bored or ?nutrition needed. RECOMMENDATIONS:  1) consider using measuring tools to see if portions have migrated upward. 2) may benefit from ~30-45 g carb with each meal - might this help energy levels, decrease snacking - worth investigation. 3) are you insulin resistant in the am - check your Patrice Roche 2 hour post meal timed from first bite.   4) check to see if your snacks are adding up in kcal intakes - although they are healthy choices, might take in less total kcal if you build the meal.  5) many of the pre and pro biotics are plant sources and dietary fiber options - help with cholesterol, satiety   6) continue to choose healthy oils within food budget - look to percent of saturated fat when assessing healthy oils. Next provider visit is scheduled for pending. DATE DSMES TOPIC EVALUATION   11/18/21 WHAT CAN I EAT?   a. General principles   b. Determining a healthy weight   c. Nutritional terms & tools    Healthy Plate method    Carbohydrate Counting    Reading food labels    Free apps         The participant    Uses Healthy Plate principles in constructing meals Yes   Reads food labels in choosing acceptable foods Yes    The participant would benefit from measuring food portions - Katarzyna Hudson admits that she has gotten away from her measuring tools. Shared that she is uncertain if her portions are consistent with label information. Updated her that with the recent nutrition label update that portions were adjusted upwards to \"more typical portion\" and as a result she will larger portions resulting in higher carb counts. Zelda uses 100kcal portions for nuts and other snack foods but not for her pretzel crackers and mahsa chips. She is not aware of how many snack portions she might open daily. May benefit from assessing. Discussed pre and probiotic foods, heart healthy fats and encouraged dietary fiber for helping to control post prandial BG and lipids. We also discussed dietary sources of saturated fat. Encouraged Zelda to investigate if the snacking keeps her BG elevated or stable. Rian Guaman RD on 11/19/2021 at 8:28 AM    I have personally reviewed the health record, including provider notes, laboratory data and current medications before making these care and education recommendations. The time spent in this effort is included in the total time. Total minutes: 61    [id number] [facility] Emergency Adaptations for Telehealth:  Edward Ramos was evaluated through a synchronous (real-time) audio-video encounter.  The patient (or guardian if applicable) is aware that this is a billable service. Verbal consent to proceed has been obtained within the past 12 months. The visit was conducted pursuant to the emergency declaration under the [address] [place] [address] and the [employer] Act. Patient identification was verified, and a caregiver was present when  appropriate. The patient was located in a state where the provider was credentialed to provide care.

## 2021-12-06 ENCOUNTER — VIRTUAL VISIT (OUTPATIENT)
Dept: DIABETES SERVICES | Age: 66
End: 2021-12-06
Payer: COMMERCIAL

## 2021-12-06 DIAGNOSIS — E11.65 TYPE 2 DIABETES MELLITUS WITH HYPERGLYCEMIA, UNSPECIFIED WHETHER LONG TERM INSULIN USE (HCC): Primary | ICD-10-CM

## 2021-12-06 PROCEDURE — G0108 DIAB MANAGE TRN  PER INDIV: HCPCS | Performed by: DIETITIAN, REGISTERED

## 2021-12-08 NOTE — PROGRESS NOTES
[employer] [facility] for Diabetes Health  Diabetes Self-Management Education & Support Program  Encounter note    SUMMARY  Diabetes self-care management training was completed related to Healthy eating and Monitoring. The participant will return on January 14 to continue DSMES related to Physical activity. The participant did not identify SMART Goal(s):  and will practice knowledge and skills related to healthy eating and monitoring to improve diabetes self-management. EVALUATION:  Zelda and  I reviewed her Miguel Rogel sensor and vitamin C supplements related to accuracy of her data. We also discussed all other supplements with consideration for her daily MVI to ensure adequate absorption and low risk for toxicity. Discussed her meal planning strategies over the holidays and dining out. RECOMMENDATIONS:  1) can plan for 30-45 g carbohydrate per meal - try to avoid snacking. 2) make sure to give >4 hours between vitamin D and calcium supplement and MVI - will not absorb the calcium. 3) avoid taking Vitamin C in excess of 500 mg daily as this will affect your sensor accuracy. - will cover physical activity at her next visit. Next provider visit is scheduled for pending. DATE DSMES TOPIC EVALUATION     12/6/21 WHAT CAN I EAT?   a. General principles   b. Determining a healthy weight   c. Nutritional terms & tools    Healthy Plate method    Carbohydrate Counting    Reading food labels    Free apps        The participant    Uses Healthy Plate principles in constructing meals Yes   Reads food labels in choosing acceptable foods Yes    The participant would benefit from continuing to review her carbohydrates with her meals. She reports finding more balance with her meal components - carb, protein, fat, non starchy vegetables. Shared that she is not cleaning her plate and when dining out is attentive to portions. She is still under portioning her carbohydrates at times.  Reinforced building meals and limiting snacking. We did spend time reviewing her supplement intakes as she is uncertain how to dose. We did discuss all supplements - instructions were provided for her calcium and MVI to ensure consistent absorption of these when dosed in correlation to her dairy intakes. DATE DSMES TOPIC EVALUATION     12/6/21 HOW CAN BLOOD GLUCOSE MONITORING HELP ME?   a. Value of blood glucose monitoring   b. Realistic expectations   c. Differences between sensor and blood glucose values. d. Setting alerts on sensor for high/low/out of range  e. Using sensor glucose levels for treatment decisions. f. Using glucometer for treatment decisions   g. Use of sensor trend arrows when dosing insulin   h. Sensor sites on body and relative to infusion sets  i. Tips for improving adhesion    j. Downloading and using smartphone apps  k. Data sharing with provider        The participant    Can demonstrate their glucometer procedure Yes   Logs their BG readings Yes    The participant would benefit from avoiding use of Airborne and Emergen-C as these exceed the recommended dose of vitamin C when using Adán sensors - will affect the sensor readings. Caitlin Martinez RD, [facility] on 12/8/2021 at 11:35 AM    I have personally reviewed the health record, including provider notes, laboratory data and current medications before making these care and education recommendations. The time spent in this effort is included in the total time. Total minutes: 60 minutes    [facility] Emergency Adaptations for Telehealth:  Marli Pal was evaluated through a synchronous (real-time) audio-video encounter. The patient (or guardian if applicable) is aware that this is a billable service. Verbal consent to proceed has been obtained within the past 12 months.  The visit was conducted pursuant to the emergency declaration under the [facility], [facility]5 waiver authority and the Coronavirus Preparedness and Response Supplemental Appropriations Act. Patient identification was verified, and a caregiver was present when  appropriate. The patient was located in a state where the provider was credentialed to provide care.

## 2022-01-21 ENCOUNTER — VIRTUAL VISIT (OUTPATIENT)
Dept: DIABETES SERVICES | Age: 67
End: 2022-01-21
Payer: COMMERCIAL

## 2022-01-21 DIAGNOSIS — E11.65 TYPE 2 DIABETES MELLITUS WITH HYPERGLYCEMIA, UNSPECIFIED WHETHER LONG TERM INSULIN USE (HCC): Primary | ICD-10-CM

## 2022-01-21 PROCEDURE — G0108 DIAB MANAGE TRN  PER INDIV: HCPCS | Performed by: DIETITIAN, REGISTERED

## 2022-01-24 NOTE — PROGRESS NOTES
[facility] for Diabetes Health  Diabetes Self-Management Education & Support Program  Encounter note    SUMMARY  Diabetes self-care management training was completed related to Healthy coping. The participant's return is pending at this time as she needed to stop our visit early and will call back to continue DSMES related to Healthy coping and Problem solving. The participant did not identify SMART Goal(s):  and will practice knowledge and skills related to healthy eating and monitoring and medications to improve diabetes self-management. EVALUATION:  Garret Huerta shared that stress levels related to her work are significantly impacting her BG. Shared that she \"has survived the holiday season but work has been difficult. She reports that she has found she is snacking more, BG are up, finding that she is experiencing more neuropathy type symptoms. Reports that she has completed her dilated eye exam earlier today - good report. RECOMMENDATIONS:  1) continue to use your stress management techniques. 2) consider taking your lunch break and walking inside the house  3) will forward chair yoga handout. Next provider visit is scheduled for pending. DATE DSMES TOPIC EVALUATION     1/21/22 HOW DO I FIND SUPPORT TO TACKLE THIS CONDITION?   a. Normal responses to diabetes diagnosis or complication    Shock    Anger & resentment    Guilt/self-blame    Sadness & worry    Depression     Anxiety    Pregnancy   b. Constructive strategies to normal responses     Exploring feelings & attitudes    Motivation: Cost versus benefits analysis    Problem-solving: Chain analysis    Obtaining support: Communicating   i. Family & friends   ii. Health team   iii.  Community   c. Stress    Symptoms    Managing stress    Fill your tool kit        The participant can identify people that support them in caring for their diabetes health:  Yes    The participant would like to pursue the following in keeping themselves healthy after completing the program:  Not set at this visit    The participant would benefit from finding out what she feels she can use as healthful interventions for managing her stress level. She shared that she has used deep breathing, standing and walking breaks with working from home but her times using these techniques is being reduced recently with increasing work demands. In the past, she has taken walking breaks outside but is finding with cold weather and work demands these are decreasing. Ginna Carlson RD,[facility] on 1/24/2022 at 10:17 AM    Total minutes: 30    [facility]GEE-[facility] Emergency Adaptations for Telehealth:  Sumeet Ortiz, was evaluated through a synchronous (real-time) audio-video encounter. The patient (or guardian if applicable) is aware that this is a billable service, which includes applicable co-pays. This Virtual Visit was conducted with patient's (and/or legal guardian's) consent. The visit was conducted pursuant to the emergency declaration under the [address] [place] [address] authority and the [employer] and [employer] General Act. Patient identification was verified, and a caregiver was present when appropriate. The patient was located in a state where the provider was licensed to provide care.

## 2022-03-18 PROBLEM — E21.0 PRIMARY HYPERPARATHYROIDISM (HCC): Status: ACTIVE | Noted: 2021-10-28

## 2022-03-18 PROBLEM — I10 ESSENTIAL HYPERTENSION: Status: ACTIVE | Noted: 2019-10-10

## 2022-03-19 PROBLEM — E04.1 RIGHT THYROID NODULE: Status: ACTIVE | Noted: 2021-10-28

## 2022-03-19 PROBLEM — E78.00 HIGH CHOLESTEROL: Status: ACTIVE | Noted: 2019-10-10

## 2022-03-20 PROBLEM — E11.8 CONTROLLED TYPE 2 DIABETES MELLITUS WITH COMPLICATION, WITHOUT LONG-TERM CURRENT USE OF INSULIN (HCC): Status: ACTIVE | Noted: 2019-10-10

## 2022-03-20 PROBLEM — K76.0 FATTY LIVER: Status: ACTIVE | Noted: 2019-10-10

## 2022-04-11 ENCOUNTER — APPOINTMENT (OUTPATIENT)
Dept: PHYSICAL THERAPY | Age: 67
End: 2022-04-11

## 2022-05-11 ENCOUNTER — HOSPITAL ENCOUNTER (OUTPATIENT)
Dept: PHYSICAL THERAPY | Age: 67
Discharge: HOME | End: 2022-05-11
Payer: COMMERCIAL

## 2022-05-11 PROCEDURE — 97162 PT EVAL MOD COMPLEX 30 MIN: CPT | Performed by: PHYSICAL MEDICINE & REHABILITATION

## 2022-05-11 PROCEDURE — 97535 SELF CARE MNGMENT TRAINING: CPT | Performed by: PHYSICAL MEDICINE & REHABILITATION

## 2022-05-11 NOTE — PROGRESS NOTES
PT INITIAL EVALUATION NOTE 2-15    Patient Name: Chaya Dumont  Date:2022  : 1955  [x]  Patient  Verified  Payor: Gregorio Pack / Plan: [employer] HMO/CHOICE PLUS/POS / Product Type: HMO /    In time: 56  Out time: 0245  Total Treatment Time (min): 45  Visit #: 1    Treatment Area: Fecal incontinence [R15.9]    SUBJECTIVE  Pain Level (0-10 scale):  0   Any medication changes, allergies to medications, adverse drug reactions, diagnosis change, or new procedure performed?: [] No    [x] Yes (see summary sheet for update)  Subjective:       Pt is a 61year old female with complaints of difficulty clearing her rectum with attempts to have a BM. She will strain on the toilet to pass stool, has difficulty cleaning afterward. She has occasional NICHOLAS with coughing but does not consider this bothersome. She underwent bladder supension surgery with anterior and posterior rectocele repair in . Following this surgery she noted significant improvement in vaginal bulge sensation and bothersome urinary urge. She is having a BM 1x per day usually in the morning rated BSS 2-3-4-5. She will strain on the toilet to clear her rectum. Her goal is to have a more complete BM with no straining, no feeling of rectal pressure afterward.         PMH  Complete hysterectomy 2016  Diabetes     PLOF: no constipation   Mechanism of Injury: none  Previous Treatment/Compliance: none  Work Hx: sedentary/ [address]  Living Situation: spouse  Pt Goals: normal bowel function  Barriers: none  Motivation: good  Substance use: none   Cognition: A & O x 4       OBJECTIVE/EXAMINATION      External Pelvic Exam  Non tender through external pelvic clock  Grade 1 anterior POP noted with valsalva  Active contraction: present  Active relaxation: present - discoordinated  Involuntary relaxation: present     Internal Vaginal Exam:  Layer 1 non tender normal tone  Layer 2/3  Non tender, normal tone  Bladder neck mobility: WNL    PERFECT SCORE CHART  P =  Power (Laycock Scale Grade 0-5)    4  E =  Endurance (How long pt holds max contraction)  5  R =  Repetitions (How many times the repeats holds)  5+  F =  Fast Twitch (How many 1 second contractions in 10 seconds) 6  E =  Elevation (Lift of post vaginal wall toward pubic bone) Present  C =  Coordinated cocontraction of transverse abdominus Absent  T = Timing (squeeze and lift with cough)  Absent    sEMG Biofeedback Assessment:   10s Work Average 17mV (Low)   Max Effort 23mV  (WNL)  10s Rest Average   Less than 3mV (WNL)   See readout in chart. 30 min Self Care/Home Management:  []  See flow sheet :    Patient instructed in the role of physical therapy in the evaluation and treatment of pelvic floor dysfunction, applicable treatment modalities discussed. Expectations for regular home exercise participation and expected visit frequency in to achieve the patient's goals were discussed. Patient instructed in pelvic floor anatomy and function including levator ani, puborectalis and superficial pelvic  musculature. Patient was provided dietary information to improve stool quality including increasing soluble fiber intake (Bran Buds), probiotics (Activia yogurt) and increased water intake (6-8 glasses a day). Pt instructed in use of Waldo stool chart to track progress. Patient educated in proper defecation posture and technique including use of Squatty Potty for better puborectalis ms relaxation. Pt to avoid straining to pass stool in order to decrease strain on pelvic floor. Rationale: improve coordination and improve patient understanding, change daily habits  to improve the patients ability to eliminate constipation.        With   [] TE   [] TA   [] Neuro   [] SC   [] other: Patient Education: [x] Review HEP    [] Progressed/Changed HEP based on:   [] positioning   [] body mechanics   [] transfers   [] heat/ice application    [x] other:  Above Other Objective/Functional Measures:     [place] Female PFQ  Bladder  Moderate  Bowel Moderate  Prolapse Low  Sexual Function  Low    Pain Level (0-10 scale) post treatment: 0       ASSESSMENT:   Pt with complaints of difficulty clearing rectum, constipation, presents with discoordination in pelvic floor musculature, poor bowel habits. She will benefit from skilled PT services to address her limitations and achieve her functional goals as stated on the plan of care.       [x]  See Plan of Bola Cantrell, PT, MSPT  5/11/2022

## 2022-05-11 NOTE — PROGRESS NOTES
Physical Therapy at [facility],   [facility]  [address] [place] [address] Amrita Rogers  Phone: [phone]  Fax: [phone]    Plan of Care/ Statement of Necessity for Physical Therapy Services 2-15    Patient name: Lashon Ambriz  : 1955  Provider#: [id number]  Referral source: Vince Serrato MD      Medical/Treatment Diagnosis: Fecal incontinence [R15.9]     Prior Hospitalization: see medical history     Comorbidities: Diabetes, hysterectomy   Prior Level of Function: no difficulty. Medications: Verified on Patient Summary List    Start of Care: 22      Onset Date: 6mos       The Plan of Care and following information is based on the information from the initial evaluation. Assessment/ key information: Pt with complaints of difficulty clearing rectum, constipation, presents with discoordination in pelvic floor musculature, poor bowel habits. She will benefit from skilled PT services to address her limitations and achieve her functional goals as stated on the plan of care. Evaluation Complexity History MEDIUM  Complexity : 1-2 comorbidities / personal factors will impact the outcome/ POC ; Examination MEDIUM Complexity : 3 Standardized tests and measures addressing body structure, function, activity limitation and / or participation in recreation  ;Presentation MEDIUM Complexity : Evolving with changing characteristics  ; Clinical Decision Making MEDIUM Complexity : FOTO score of 26-74  Overall Complexity Rating: MEDIUM    Problem List: decrease ROM, decrease strength, decrease ADL/ functional abilitiies, decrease activity tolerance, decrease flexibility/ joint mobility and other functional constipation   Treatment Plan may include any combination of the following: Therapeutic exercise, Therapeutic activities, Neuromuscular re-education, Physical agent/modality, Manual therapy, Patient education, Self Care training and Functional mobility training  Patient / Family readiness to learn indicated by: asking questions  Persons(s) to be included in education: patient (P)  Barriers to Learning/Limitations: None  Patient Goal (s): no constipation  Patient Self Reported Health Status: good  Rehabilitation Potential: good    Short Term Goals: To be accomplished in 6 weeks:  Patient will be independent with a progressive home exercise program    Patient will demonstrate & utilized pelvic floor ms protection techniques   Patient will demonstrate improved PFM strength to 3+/5 bilaterally. Patient will be independent with use of Squatty Potty or similar for proper toileting position. Patient will report improved stool quality to BSS 4 daily. Long Term Goals: To be accomplished in 12 weeks:  Patient will demonstrate 4/5 PFM strength bilaterally  Patient will demonstrate 10 second PFM holds at 4/5   Patient will have no difficulty clearing rectum with stooling attempts. Patient will demonstrate sEMG biofeedback function readings WNL for relaxation and bulge. Frequency / Duration: Patient to be seen 1 times per week for 6-12 weeks. Patient/ Caregiver education and instruction: self care    [x]  Plan of care has been reviewed with PTA    Surekha Kaufman PT, MSPT    5/11/2022     ________________________________________________________________________    I certify that the above Therapy Services are being furnished while the patient is under my care. I agree with the treatment plan and certify that this therapy is necessary.     Physician's Signature:____________________  Date:____________Time: _________      Shaunna Sandifer, MD

## 2022-05-16 ENCOUNTER — HOSPITAL ENCOUNTER (OUTPATIENT)
Dept: PHYSICAL THERAPY | Age: 67
Discharge: HOME | End: 2022-05-16
Payer: COMMERCIAL

## 2022-05-16 PROCEDURE — 97110 THERAPEUTIC EXERCISES: CPT | Performed by: PHYSICAL MEDICINE & REHABILITATION

## 2022-05-16 PROCEDURE — 97112 NEUROMUSCULAR REEDUCATION: CPT | Performed by: PHYSICAL MEDICINE & REHABILITATION

## 2022-05-16 NOTE — PROGRESS NOTES
PT DAILY TREATMENT NOTE 2-15    Patient Name: Vipul Muñoz  Date:2022  : 1955  [x]  Patient  Verified  Payor: Cyndi Fenton / Plan: [id number] HMO/CHOICE PLUS/POS / Product Type: HMO /    In time: 0715  Out time: 0800  Total Treatment Time (min): 45  Visit #:  2    Treatment Area: Fecal incontinence [R15.9]    SUBJECTIVE  Pain Level (0-10 scale): 0   Any medication changes, allergies to medications, adverse drug reactions, diagnosis change, or new procedure performed?: [x] No    [] Yes (see summary sheet for update)  Subjective functional status/changes:   [] No changes reported    OBJECTIVE      30  min Therapeutic Exercise:  [] See flow sheet :  PFMT with emphasis on bulge and relax, full excursion contraction. Rationale: increase strength and improve coordination to improve the patients ability to eliminate constipation. 15  min Neuromuscular Re-education:  []  See flow sheet :    sEMG biofeedback PFMT training with vaginal sensor, constant PT attendance for guidance. Rationale: improve coordination and increase proprioception  to improve the patients ability to eliminate constipation. With   [x] TE   [] TA   [] Neuro   [x] SC   [] other: Patient Education: [x] Review HEP    [] Progressed/Changed HEP based on:   [x] positioning   [x] body mechanics   [] transfers   [] heat/ice application    [x] other:      Other Objective/Functional Measures: Demonstrated good understanding of all concepts discussed. Pain Level (0-10 scale) post treatment: 0     ASSESSMENT/Changes in Function:     Patient will continue to benefit from skilled PT services to modify and progress therapeutic interventions to attain remaining goals. [x]  See Plan of Care  []  See progress note/recertification  []  See Discharge Summary         Progress towards goals / Updated goals:  Able to advance exercises today with good tolerance.   Pt continues to need instruction for correct exercise form and performance. Continues to demonstrate good potential to achieve functional goals stated on the plan of care.       PLAN  [x]  Upgrade activities as tolerated     []  Continue plan of care  []  Update interventions per flow sheet       []  Discharge due to:_  []  Other:_      Hue Hollingsworth PT, MSPT  5/16/2022

## 2022-05-20 ENCOUNTER — APPOINTMENT (OUTPATIENT)
Dept: PHYSICAL THERAPY | Age: 67
End: 2022-05-20

## 2022-05-23 ENCOUNTER — HOSPITAL ENCOUNTER (OUTPATIENT)
Dept: PHYSICAL THERAPY | Age: 67
Discharge: HOME | End: 2022-05-23
Payer: COMMERCIAL

## 2022-05-23 PROCEDURE — 97110 THERAPEUTIC EXERCISES: CPT | Performed by: PHYSICAL MEDICINE & REHABILITATION

## 2022-05-23 PROCEDURE — 97535 SELF CARE MNGMENT TRAINING: CPT | Performed by: PHYSICAL MEDICINE & REHABILITATION

## 2022-05-23 NOTE — PROGRESS NOTES
PT DAILY TREATMENT NOTE 2-15    Patient Name: Pamila Lanes  Date:2022  : 1955  [x]  Patient  Verified  Payor: Thom Means / Plan: [address] [place] [address] HMO/CHOICE PLUS/POS / Product Type: HMO /    In time: 845   Out time: 930  Total Treatment Time (min): 45  Visit #:  3    Treatment Area: Fecal incontinence [R15.9]    SUBJECTIVE  Pain Level (0-10 scale): 0   Any medication changes, allergies to medications, adverse drug reactions, diagnosis change, or new procedure performed?: [x] No    [] Yes (see summary sheet for update)  Subjective functional status/changes:   [] No changes reported  Started Metamucil Thursday, was able to have a good BM by  with no straining. OBJECTIVE    30  min Therapeutic Exercise:  [x] See flow sheet :    PFMT bulge/relax plus:       Access Code: [email or web address]  URL: [email or web address]  Date: 2022  Prepared by: Jl Go    Exercises  Standing Hip Flexor Stretch - 1 x daily - 7 x weekly - 4 reps - 30s hold  Seated Hamstring Stretch - 1 x daily - 7 x weekly - 4 reps - 30s hold  Seated Piriformis Stretch with Trunk Bend - 1 x daily - 7 x weekly - 4 reps - 30s hold  Supine Pelvic Floor Stretch - 1 x daily - 7 x weekly - 10 reps - 30s hold     Rationale: increase strength and improve coordination to improve the patients ability to eliminate constipation. 15 min Self Care/Home Management:  []  See flow sheet :   Constipation management strategies. Rationale:   to improve the patients ability to eliminate constipation. With   [x] TE   [] TA   [] Neuro   [] SC   [] other: Patient Education: [x] Review HEP    [x] Progressed/Changed HEP based on:   [x] positioning   [] body mechanics   [] transfers   [] heat/ice application    [] other:      Other Objective/Functional Measures: Demonstrated good understanding of all concepts discussed today.      Pain Level (0-10 scale) post treatment: 0     ASSESSMENT/Changes in Function: Patient will continue to benefit from skilled PT services to modify and progress therapeutic interventions to attain remaining goals. [x]  See Plan of Care  []  See progress note/recertification  []  See Discharge Summary         Progress towards goals / Updated goals:  Able to advance exercises today with good tolerance. Pt continues to need instruction for correct exercise form and performance. Continues to demonstrate good potential to achieve functional goals stated on the plan of care.       PLAN  [x]  Upgrade activities as tolerated     []  Continue plan of care  []  Update interventions per flow sheet       []  Discharge due to:_  []  Other:_      Joey Hinojosa PT, MSPT  5/23/2022

## 2022-06-03 ENCOUNTER — APPOINTMENT (OUTPATIENT)
Dept: PHYSICAL THERAPY | Age: 67
End: 2022-06-03
Payer: COMMERCIAL

## 2022-06-06 ENCOUNTER — HOSPITAL ENCOUNTER (OUTPATIENT)
Dept: PHYSICAL THERAPY | Age: 67
Discharge: HOME | End: 2022-06-06
Payer: COMMERCIAL

## 2022-06-06 PROCEDURE — 97110 THERAPEUTIC EXERCISES: CPT | Performed by: PHYSICAL MEDICINE & REHABILITATION

## 2022-06-06 PROCEDURE — 97112 NEUROMUSCULAR REEDUCATION: CPT | Performed by: PHYSICAL MEDICINE & REHABILITATION

## 2022-06-06 NOTE — PROGRESS NOTES
PT DAILY TREATMENT NOTE 2-15    Patient Name: Timothy Aguilar  Date:2022  : 1955  [x]  Patient  Verified  Payor: Joy Kingsley / Plan: [id number] HMO/CHOICE PLUS/POS / Product Type: HMO /    In time: 0715  Out time: 0800  Total Treatment Time (min): 45  Visit #:  4    Treatment Area: Fecal incontinence [R15.9]    SUBJECTIVE  Pain Level (0-10 scale): 0   Any medication changes, allergies to medications, adverse drug reactions, diagnosis change, or new procedure performed?: [x] No    [] Yes (see summary sheet for update)  Subjective functional status/changes:   [] No changes reported    Improved stool quality and frequency with addition of psyllium and magnesium  Improved BM ease with use of Squatty Potty. OBJECTIVE      30  min Therapeutic Exercise:  [] See flow sheet :  PFMT with emphasis on bulge and relax, full excursion contraction. Rationale: increase strength and improve coordination to improve the patients ability to eliminate constipation. 15  min Neuromuscular Re-education:  []  See flow sheet :    sEMG biofeedback PFMT training with vaginal sensor, constant PT attendance for guidance. Rationale: improve coordination and increase proprioception  to improve the patients ability to eliminate constipation. With   [x] TE   [] TA   [] Neuro   [x] SC   [] other: Patient Education: [x] Review HEP    [] Progressed/Changed HEP based on:   [x] positioning   [x] body mechanics   [] transfers   [] heat/ice application    [x] other:      Other Objective/Functional Measures:     sEMG Biofeedback Assessment:   10s Work Average  30mV  (WNL)  Max Effort  35mV (WNL)  10s Rest Average  3.5mV (WNL)   See readout in chart. Pain Level (0-10 scale) post treatment: 0     ASSESSMENT/Changes in Function:  Significant improvement in bulge/relax/bear down with sEMG biofeedback cues.      Patient will continue to benefit from skilled PT services to modify and progress therapeutic interventions to attain remaining goals. [x]  See Plan of Care  []  See progress note/recertification  []  See Discharge Summary         Progress towards goals / Updated goals:  Able to advance exercises today with good tolerance. Pt continues to need instruction for correct exercise form and performance. Continues to demonstrate good potential to achieve functional goals stated on the plan of care. PLAN  [x]  Upgrade activities as tolerated     []  Continue plan of care  []  Update interventions per flow sheet       []  Discharge due to:_  [x]  Other: Advance abdominal exs next visit.       Lesley Beach, PT, MSPT  6/6/2022

## 2022-06-06 NOTE — PROGRESS NOTES
PT DAILY TREATMENT NOTE 2-15    Patient Name: Liliam Bunch  Date:2022  : 1955  [x]  Patient  Verified  Payor: Dyana Patrick / Plan: [employer]/CHOICE PLUS/POS / Product Type: HMO /    In time: 715  Out time: 0800  Total Treatment Time (min): 45  Visit #:  4    Treatment Area: Fecal incontinence [R15.9]    SUBJECTIVE  Pain Level (0-10 scale): 0   Any medication changes, allergies to medications, adverse drug reactions, diagnosis change, or new procedure performed?: [x] No    [] Yes (see summary sheet for update)  Subjective functional status/changes:   [] No changes reported    Continues to note improvements in bowel habits, improved stool quality and frequency noted with addition of psyllium and magnesium. Notes improved BM ease with use of Squatty Potty. OBJECTIVE      30  min Therapeutic Exercise:  [] See flow sheet :  PFMT with emphasis on bulge and relax, full excursion contraction. Rationale: increase strength and improve coordination to improve the patients ability to eliminate constipation. 15  min Neuromuscular Re-education:  []  See flow sheet :    sEMG biofeedback PFMT training with vaginal sensor, constant PT attendance for guidance. Rationale: improve coordination and increase proprioception  to improve the patients ability to eliminate constipation. With   [x] TE   [] TA   [] Neuro   [x] SC   [] other: Patient Education: [x] Review HEP    [] Progressed/Changed HEP based on:   [x] positioning   [x] body mechanics   [] transfers   [] heat/ice application    [x] other:      Other Objective/Functional Measures: Demonstrated good understanding of all concepts discussed. Pain Level (0-10 scale) post treatment: 0     ASSESSMENT/Changes in Function:   Significant improvement noted in sEMG biofeedback performance with bulge/relax.       sEMG Biofeedback Assessment:   10s Work Average  25mV  (WNL)  Max Effort 30mV  (WNL)  10s Rest Average   3.5mV (WNL)   See readout in chart. Patient will continue to benefit from skilled PT services to modify and progress therapeutic interventions to attain remaining goals. [x]  See Plan of Care  []  See progress note/recertification  []  See Discharge Summary         Progress towards goals / Updated goals:  Able to advance exercises today with good tolerance. Pt continues to need instruction for correct exercise form and performance. Continues to demonstrate good potential to achieve functional goals stated on the plan of care. PLAN  [x]  Upgrade activities as tolerated     []  Continue plan of care  []  Update interventions per flow sheet       []  Discharge due to:_  [x]  Other: Advance abdominal strengthening exs.         Mina Caro, PT, MSPT  6/6/2022

## 2022-06-13 ENCOUNTER — HOSPITAL ENCOUNTER (OUTPATIENT)
Dept: PHYSICAL THERAPY | Age: 67
Discharge: HOME | End: 2022-06-13
Payer: COMMERCIAL

## 2022-06-13 PROCEDURE — 97110 THERAPEUTIC EXERCISES: CPT | Performed by: PHYSICAL MEDICINE & REHABILITATION

## 2022-06-13 NOTE — PROGRESS NOTES
PT DAILY TREATMENT NOTE 2-15    Patient Name: Joya Collado  Date:2022  : 1955  [x]  Patient  Verified  Payor: Isidoro Kurtz / Plan: [id number] HMO/CHOICE PLUS/POS / Product Type: HMO /    In time: 0715  Out time: 0800  Total Treatment Time (min): 45  Visit #:  5    Treatment Area: Fecal incontinence [R15.9]    SUBJECTIVE  Pain Level (0-10 scale): 0   Any medication changes, allergies to medications, adverse drug reactions, diagnosis change, or new procedure performed?: [x] No    [] Yes (see summary sheet for update)  Subjective functional status/changes:   [] No changes reported    Continues to note improvements in bowel habits, improved stool quality and frequency noted with addition of psyllium and magnesium. Notes improved BM ease with use of Squatty Potty. OBJECTIVE      45  min Therapeutic Exercise:  [] See flow sheet :  PFMT with emphasis on bulge and relax, full excursion contraction. Access Code: [email or web address]  URL: [employer]/  Date: 2022  Prepared by: Rocky Brito    Exercises  Standing Hip Flexor Stretch - 1 x daily - 7 x weekly - 4 reps - 30s hold  Seated Hamstring Stretch - 1 x daily - 7 x weekly - 4 reps - 30s hold  Seated Piriformis Stretch with Trunk Bend - 1 x daily - 7 x weekly - 4 reps - 30s hold  Supine Pelvic Floor Stretch - 1 x daily - 7 x weekly - 4 reps - 30 seconds hold  Supine Transversus Abdominis Bracing with Pelvic Floor Contraction - 1 x daily - 7 x weekly - 5 reps - 5 sets  Supine March with Posterior Pelvic Tilt - 1 x daily - 7 x weekly - 5 reps - 5 sets  Bent Knee Fallouts - 1 x daily - 7 x weekly - 5 reps - 5 sets        Rationale: increase strength and improve coordination to improve the patients ability to eliminate constipation.                  With   [x] TE   [] TA   [] Neuro   [x] SC   [] other: Patient Education: [x] Review HEP    [] Progressed/Changed HEP based on:   [x] positioning   [x] body mechanics   [] transfers   [] heat/ice application    [x] other:      Other Objective/Functional Measures: Demonstrated good understanding of all concepts discussed. Pain Level (0-10 scale) post treatment: 0     ASSESSMENT/Changes in Function:   Able to progress abdominal strengthening exs with good tolerance. sEMG Biofeedback Assessment:   10s Work Average  25mV  (WNL)  Max Effort 30mV  (WNL)  10s Rest Average   3.5mV (WNL)   See readout in chart. Patient will continue to benefit from skilled PT services to modify and progress therapeutic interventions to attain remaining goals. [x]  See Plan of Care  []  See progress note/recertification  []  See Discharge Summary         Progress towards goals / Updated goals:  Able to advance exercises today with good tolerance. Pt continues to need instruction for correct exercise form and performance. Continues to demonstrate good potential to achieve functional goals stated on the plan of care. PLAN  [x]  Upgrade activities as tolerated     []  Continue plan of care  []  Update interventions per flow sheet       []  Discharge due to:_  [x]  Other: Advance abdominal strengthening exs.         Penfield Scale, PT, MSPT  6/13/2022

## 2024-02-28 ENCOUNTER — HOSPITAL ENCOUNTER (OUTPATIENT)
Facility: HOSPITAL | Age: 69
Discharge: HOME | End: 2024-03-02
Attending: INTERNAL MEDICINE

## 2024-02-28 DIAGNOSIS — Z00.00 ROUTINE GENERAL MEDICAL EXAMINATION AT A HEALTH CARE FACILITY: ICD-10-CM

## 2024-02-28 PROCEDURE — 75571 CT HRT W/O DYE W/CA TEST: CPT

## (undated) DEVICE — NEEDLE HYPO 25GA L1.5IN BLU POLYPR HUB S STL REG BVL STR

## (undated) DEVICE — SOLUTION IRRIG 1000ML 0.9% SOD CHL USP POUR PLAS BTL

## (undated) DEVICE — INSULATED BLADE ELECTRODE: Brand: EDGE

## (undated) DEVICE — SUTURE MCRYL SZ 5-0 L18IN ABSRB UD L13MM P-3 3/8 CIR PRIM Y493G

## (undated) DEVICE — STRIP,CLOSURE,WOUND,MEDI-STRIP,1/2X4: Brand: MEDLINE

## (undated) DEVICE — ROCKER SWITCH PENCIL BLADE ELECTRODE, HOLSTER: Brand: EDGE

## (undated) DEVICE — RESERVOIR,SUCTION,100CC,SILICONE: Brand: MEDLINE

## (undated) DEVICE — BIPOLAR FORCEPS CORD: Brand: VALLEYLAB

## (undated) DEVICE — BASIN ST MAJOR-NO CAUTERY: Brand: MEDLINE INDUSTRIES, INC.

## (undated) DEVICE — MASTISOL ADHESIVE LIQ 2/3ML

## (undated) DEVICE — SOLUTION IRRIG 1000ML STRL H2O USP PLAS POUR BTL

## (undated) DEVICE — SUTURE VCRL SZ 3-0 L27IN ABSRB UD L26MM SH 1/2 CIR J416H

## (undated) DEVICE — TRAY PREP DRY W/ PREM GLV 2 APPL 6 SPNG 2 UNDPD 1 OVERWRAP

## (undated) DEVICE — SUTURE PERMAHAND SZ 2-0 L18IN NONABSORBABLE BLK L26MM PS 1588H

## (undated) DEVICE — GOWN,AURORA,NON-REINFORCED,2XL: Brand: MEDLINE

## (undated) DEVICE — SYR 10ML CTRL LR LCK NSAF LF --

## (undated) DEVICE — SPONGE: SPECIALTY PEANUT XR 100/CS: Brand: MEDICAL ACTION INDUSTRIES

## (undated) DEVICE — ELECTRO LUBE IS A SINGLE PATIENT USE DEVICE THAT IS INTENDED TO BE USED ON ELECTROSURGICAL ELECTRODES TO REDUCE STICKING.: Brand: KEY SURGICAL ELECTRO LUBE

## (undated) DEVICE — BONE WAX WHITE: Brand: BONE WAX WHITE

## (undated) DEVICE — 40418 TRENDELENBURG ONE-STEP ARM PROTECTORS LARGE (1 PAIR): Brand: 40418 TRENDELENBURG ONE-STEP ARM PROTECTORS LARGE (1 PAIR)

## (undated) DEVICE — TOWEL SURG W17XL27IN STD BLU COT NONFENESTRATED PREWASHED

## (undated) DEVICE — GARMENT,MEDLINE,DVT,INT,CALF,MED, GEN2: Brand: MEDLINE

## (undated) DEVICE — SMOKE EVACUATION PENCIL: Brand: VALLEYLAB

## (undated) DEVICE — INTENDED FOR TISSUE SEPARATION, AND OTHER PROCEDURES THAT REQUIRE A SHARP SURGICAL BLADE TO PUNCTURE OR CUT.: Brand: BARD-PARKER ® CARBON RIB-BACK BLADES

## (undated) DEVICE — SUT ETHLN 3-0 18IN PS1 BLK --

## (undated) DEVICE — PACK,EENT,TURBAN DRAPE,PK II: Brand: MEDLINE